# Patient Record
Sex: FEMALE | Race: WHITE | NOT HISPANIC OR LATINO | Employment: FULL TIME | URBAN - METROPOLITAN AREA
[De-identification: names, ages, dates, MRNs, and addresses within clinical notes are randomized per-mention and may not be internally consistent; named-entity substitution may affect disease eponyms.]

---

## 2023-04-12 DIAGNOSIS — E78.2 MIXED HYPERLIPIDEMIA: ICD-10-CM

## 2023-04-12 DIAGNOSIS — I10 ESSENTIAL (PRIMARY) HYPERTENSION: ICD-10-CM

## 2023-04-12 PROBLEM — L20.9 ATOPIC DERMATITIS: Status: ACTIVE | Noted: 2023-04-12

## 2023-04-12 PROBLEM — F32.A ANXIETY AND DEPRESSION: Status: ACTIVE | Noted: 2023-04-12

## 2023-04-12 PROBLEM — H60.90 OTITIS EXTERNA: Status: RESOLVED | Noted: 2023-04-12 | Resolved: 2023-04-12

## 2023-04-12 PROBLEM — J30.9 ALLERGIC RHINITIS: Status: ACTIVE | Noted: 2023-04-12

## 2023-04-12 PROBLEM — R10.2 FEMALE PELVIC PAIN: Status: ACTIVE | Noted: 2023-04-12

## 2023-04-12 PROBLEM — F41.9 ANXIETY AND DEPRESSION: Status: ACTIVE | Noted: 2023-04-12

## 2023-04-12 PROBLEM — M54.50 LOW BACK PAIN: Status: ACTIVE | Noted: 2023-04-12

## 2023-04-12 PROBLEM — N94.9 VAGINAL DISCOMFORT: Status: RESOLVED | Noted: 2023-04-12 | Resolved: 2023-04-12

## 2023-04-12 PROBLEM — E66.01 MORBID OBESITY WITH BODY MASS INDEX (BMI) OF 40.0 OR HIGHER (MULTI): Status: ACTIVE | Noted: 2023-04-12

## 2023-04-12 PROBLEM — M94.0 SLIPPING RIB SYNDROME: Status: ACTIVE | Noted: 2023-04-12

## 2023-04-12 PROBLEM — S22.39XA RIB FRACTURE: Status: RESOLVED | Noted: 2023-04-12 | Resolved: 2023-04-12

## 2023-04-12 RX ORDER — CETIRIZINE HYDROCHLORIDE 10 MG/1
1 TABLET ORAL DAILY
COMMUNITY
Start: 2021-04-20 | End: 2023-05-09

## 2023-04-12 RX ORDER — HYDROCODONE BITARTRATE AND ACETAMINOPHEN 5; 325 MG/1; MG/1
TABLET ORAL
COMMUNITY
Start: 2023-02-17 | End: 2023-05-09

## 2023-04-12 RX ORDER — LOSARTAN POTASSIUM 50 MG/1
50 TABLET ORAL DAILY
COMMUNITY
End: 2023-08-31 | Stop reason: SDUPTHER

## 2023-04-12 RX ORDER — HYDROXYZINE HYDROCHLORIDE 25 MG/1
TABLET, FILM COATED ORAL
COMMUNITY
Start: 2021-10-08 | End: 2023-04-17

## 2023-04-12 RX ORDER — ATORVASTATIN CALCIUM 10 MG/1
10 TABLET, FILM COATED ORAL DAILY
Qty: 90 TABLET | Refills: 0 | Status: SHIPPED | OUTPATIENT
Start: 2023-04-12 | End: 2023-05-09

## 2023-04-12 RX ORDER — ATORVASTATIN CALCIUM 10 MG/1
10 TABLET, FILM COATED ORAL DAILY
COMMUNITY
End: 2023-08-31 | Stop reason: SDUPTHER

## 2023-04-12 RX ORDER — DESVENLAFAXINE 100 MG/1
100 TABLET, EXTENDED RELEASE ORAL DAILY
COMMUNITY
End: 2023-05-09

## 2023-04-12 RX ORDER — FLUTICASONE PROPIONATE 50 MCG
SPRAY, SUSPENSION (ML) NASAL
COMMUNITY
Start: 2022-04-20 | End: 2023-05-09 | Stop reason: SDUPTHER

## 2023-04-12 RX ORDER — IPRATROPIUM BROMIDE 21 UG/1
SPRAY, METERED NASAL
COMMUNITY
Start: 2022-09-04

## 2023-04-12 RX ORDER — MONTELUKAST SODIUM 10 MG/1
1 TABLET ORAL DAILY
COMMUNITY
Start: 2023-01-16 | End: 2023-08-31

## 2023-04-12 RX ORDER — IBUPROFEN 800 MG/1
1 TABLET ORAL
COMMUNITY
Start: 2022-11-28 | End: 2023-05-09

## 2023-04-12 RX ORDER — LOSARTAN POTASSIUM 50 MG/1
50 TABLET ORAL DAILY
Qty: 90 TABLET | Refills: 0 | Status: SHIPPED | OUTPATIENT
Start: 2023-04-12 | End: 2023-05-09 | Stop reason: SDUPTHER

## 2023-04-17 DIAGNOSIS — F41.9 ANXIETY DISORDER, UNSPECIFIED: ICD-10-CM

## 2023-04-17 DIAGNOSIS — F32.A DEPRESSION, UNSPECIFIED: ICD-10-CM

## 2023-04-17 RX ORDER — HYDROXYZINE HYDROCHLORIDE 25 MG/1
TABLET, FILM COATED ORAL
Qty: 40 TABLET | Refills: 2 | Status: SHIPPED | OUTPATIENT
Start: 2023-04-17

## 2023-05-09 ENCOUNTER — OFFICE VISIT (OUTPATIENT)
Dept: PRIMARY CARE | Facility: CLINIC | Age: 48
End: 2023-05-09
Payer: COMMERCIAL

## 2023-05-09 VITALS
HEART RATE: 74 BPM | OXYGEN SATURATION: 98 % | BODY MASS INDEX: 36.37 KG/M2 | WEIGHT: 213 LBS | SYSTOLIC BLOOD PRESSURE: 136 MMHG | HEIGHT: 64 IN | DIASTOLIC BLOOD PRESSURE: 84 MMHG

## 2023-05-09 DIAGNOSIS — M85.80 OSTEOPENIA, UNSPECIFIED LOCATION: ICD-10-CM

## 2023-05-09 DIAGNOSIS — L29.9 PRURITIC DISORDER: ICD-10-CM

## 2023-05-09 DIAGNOSIS — F32.A ANXIETY AND DEPRESSION: ICD-10-CM

## 2023-05-09 DIAGNOSIS — I10 BENIGN HYPERTENSION: Primary | ICD-10-CM

## 2023-05-09 DIAGNOSIS — E78.2 COMBINED HYPERLIPIDEMIA: ICD-10-CM

## 2023-05-09 DIAGNOSIS — Z12.31 VISIT FOR SCREENING MAMMOGRAM: ICD-10-CM

## 2023-05-09 DIAGNOSIS — F41.9 ANXIETY AND DEPRESSION: ICD-10-CM

## 2023-05-09 DIAGNOSIS — E66.01 MORBID OBESITY WITH BODY MASS INDEX (BMI) OF 40.0 OR HIGHER (MULTI): ICD-10-CM

## 2023-05-09 DIAGNOSIS — M25.552 HIP PAIN, LEFT: ICD-10-CM

## 2023-05-09 DIAGNOSIS — J30.1 ALLERGIC RHINITIS DUE TO POLLEN, UNSPECIFIED SEASONALITY: ICD-10-CM

## 2023-05-09 PROBLEM — M79.644 PAIN OF FINGER OF RIGHT HAND: Status: ACTIVE | Noted: 2023-05-09

## 2023-05-09 PROBLEM — R29.898 HAND WEAKNESS: Status: ACTIVE | Noted: 2023-05-09

## 2023-05-09 PROBLEM — K59.00 CONSTIPATION: Status: ACTIVE | Noted: 2023-05-09

## 2023-05-09 PROBLEM — M25.641 STIFFNESS OF FINGER JOINT OF RIGHT HAND: Status: ACTIVE | Noted: 2023-05-09

## 2023-05-09 PROBLEM — S62.624A CLOSED DISPLACED FRACTURE OF MIDDLE PHALANX OF RIGHT RING FINGER: Status: ACTIVE | Noted: 2023-05-09

## 2023-05-09 PROBLEM — S69.91XA INJURY OF FINGER OF RIGHT HAND: Status: ACTIVE | Noted: 2023-05-09

## 2023-05-09 PROCEDURE — 3079F DIAST BP 80-89 MM HG: CPT | Performed by: FAMILY MEDICINE

## 2023-05-09 PROCEDURE — 3008F BODY MASS INDEX DOCD: CPT | Performed by: FAMILY MEDICINE

## 2023-05-09 PROCEDURE — 1036F TOBACCO NON-USER: CPT | Performed by: FAMILY MEDICINE

## 2023-05-09 PROCEDURE — 3075F SYST BP GE 130 - 139MM HG: CPT | Performed by: FAMILY MEDICINE

## 2023-05-09 PROCEDURE — 99214 OFFICE O/P EST MOD 30 MIN: CPT | Performed by: FAMILY MEDICINE

## 2023-05-09 RX ORDER — FLUTICASONE PROPIONATE 50 MCG
2 SPRAY, SUSPENSION (ML) NASAL DAILY
Qty: 16 G | Refills: 3 | Status: SHIPPED | OUTPATIENT
Start: 2023-05-09 | End: 2023-06-01

## 2023-05-09 RX ORDER — DESVENLAFAXINE 100 MG/1
1 TABLET, EXTENDED RELEASE ORAL DAILY
COMMUNITY
Start: 2017-11-27 | End: 2023-10-16

## 2023-05-09 ASSESSMENT — ENCOUNTER SYMPTOMS: SHORTNESS OF BREATH: 0

## 2023-05-09 NOTE — PROGRESS NOTES
Subjective   Patient ID: Farideh Gomez is a 48 y.o. female who presents for medication review. States she has been taking the Hydroxyzine at bedtime for her itchy hands. Would like to know if she can continue to do so? States she ran out of the Zyrtec OTC 3 months ago and never went back on it. No additional concerns.     Hypertension  : Patient is taking blood pressure medications as directed.  Blood pressures have been averaging:  Pt denies Chest Pain or Shortness of Breath     Anxiety: Currently stable with anxiety, minimal symptoms.  Stable on current medications, helping with    symptoms     Itching on hands at night   No specific environment  Uses lotion   Hydroxyzine works well        Exercises     Hip a little pain at times   Remote fracture   Taking vitamin          Review of Systems   Respiratory:  Negative for shortness of breath.    Cardiovascular:  Negative for chest pain.       Objective   There were no vitals taken for this visit.    Physical Exam  Constitutional:       General: She is not in acute distress.     Appearance: Normal appearance.   HENT:      Head: Normocephalic and atraumatic.      Right Ear: Tympanic membrane, ear canal and external ear normal.      Left Ear: Tympanic membrane, ear canal and external ear normal.      Nose: Nose normal.      Mouth/Throat:      Mouth: Mucous membranes are moist.      Pharynx: Oropharynx is clear.   Eyes:      Extraocular Movements: Extraocular movements intact.      Conjunctiva/sclera: Conjunctivae normal.      Pupils: Pupils are equal, round, and reactive to light.   Neck:      Vascular: No carotid bruit.   Cardiovascular:      Rate and Rhythm: Normal rate and regular rhythm.      Pulses: Normal pulses.      Heart sounds: Normal heart sounds. No murmur heard.  Pulmonary:      Effort: Pulmonary effort is normal.      Breath sounds: Normal breath sounds. No wheezing, rhonchi or rales.   Abdominal:      General: Abdomen is flat. Bowel sounds are normal.  There is no distension.      Palpations: Abdomen is soft.      Tenderness: There is no abdominal tenderness. There is no guarding or rebound.   Musculoskeletal:         General: Normal range of motion.      Cervical back: No tenderness.   Lymphadenopathy:      Cervical: No cervical adenopathy.   Skin:     General: Skin is warm and dry.      Findings: No rash.   Neurological:      General: No focal deficit present.      Mental Status: She is alert and oriented to person, place, and time.      Cranial Nerves: No cranial nerve deficit.      Coordination: Coordination normal.      Gait: Gait normal.   Psychiatric:         Mood and Affect: Mood normal.         Behavior: Behavior normal.         Assessment/Plan   Problem List Items Addressed This Visit       Anxiety and depression    Relevant Orders    CBC    Comprehensive Metabolic Panel    Food Allergy Profile IgE    Respiratory Allergy Profile IgE    Benign hypertension - Primary    Relevant Orders    CBC    Comprehensive Metabolic Panel    Food Allergy Profile IgE    Respiratory Allergy Profile IgE    Combined hyperlipidemia    Relevant Orders    CBC    Comprehensive Metabolic Panel    Food Allergy Profile IgE    Respiratory Allergy Profile IgE    Morbid obesity with body mass index (BMI) of 40.0 or higher (CMS/[facility])    Relevant Orders    CBC    Comprehensive Metabolic Panel    Food Allergy Profile IgE    Respiratory Allergy Profile IgE     Other Visit Diagnoses       Urticaria        Relevant Orders    CBC    Comprehensive Metabolic Panel    Food Allergy Profile IgE    Respiratory Allergy Profile IgE

## 2023-05-09 NOTE — PATIENT INSTRUCTIONS
Get your blood work as ordered.  You should hear from our office with results whether they are normal are not within a few days.  Please call the office if you do not hear from us.     You should be getting cardiovascular exercise 3-5 times per week for 30-45 minutes.  This includes exercises such as running, brisk walking, biking or swimming.     Hypertension Plan:  You should check your blood pressures 2-3 times per month.  Your goal should be systolic (upper number ) < 140, and diastolic (bottom number) < 90.  Please periodically inform office of your BP numbers.  You should follow a low salt diet and exercise routinely.  It is important that you keep your weight under control.  With hypertension you should be seen in the office at least twice per year.

## 2023-05-13 NOTE — RESULT ENCOUNTER NOTE
Please notify pt of lab results, x-ray of the bones normal, pain likely related to muscles and ligaments

## 2023-05-15 ENCOUNTER — TELEPHONE (OUTPATIENT)
Dept: PRIMARY CARE | Facility: CLINIC | Age: 48
End: 2023-05-15
Payer: COMMERCIAL

## 2023-05-15 NOTE — TELEPHONE ENCOUNTER
----- Message from Cata Luis MD sent at 5/13/2023 10:34 AM EDT -----  Please notify pt of lab results, x-ray of the bones normal, pain likely related to muscles and ligaments

## 2023-05-15 NOTE — TELEPHONE ENCOUNTER
Result Communication    Resulted Orders   XR hip w pelvis    Narrative    Interpreted By:  LYNDSEY LINDQUIST MD  MRN: [id number]  Patient Name: ENEIDA COLON     STUDY:  HIP, UNILATERAL W/PELVIS WHEN PERFORMED 2-3 VIEWS;  5/10/2023 11:52 am     INDICATION:  pain.     COMPARISON:  None.     ACCESSION NUMBER(S):  [id number]     ORDERING CLINICIAN:  PORTILLO FITZGERALD     FINDINGS:  Pelvis and left hip, three views     There is no fracture. There is no dislocation. No degenerative  changes seen.       Impression    Normal radiographs of the left hip       1:56 PM      Results were successfully communicated with the patient and they acknowledged their understanding.

## 2023-06-01 DIAGNOSIS — J30.1 ALLERGIC RHINITIS DUE TO POLLEN, UNSPECIFIED SEASONALITY: ICD-10-CM

## 2023-06-01 RX ORDER — FLUTICASONE PROPIONATE 50 MCG
2 SPRAY, SUSPENSION (ML) NASAL DAILY
Qty: 16 ML | Refills: 3 | Status: SHIPPED | OUTPATIENT
Start: 2023-06-01 | End: 2023-06-27

## 2023-06-12 ENCOUNTER — LAB (OUTPATIENT)
Dept: LAB | Facility: LAB | Age: 48
End: 2023-06-12
Payer: COMMERCIAL

## 2023-06-12 DIAGNOSIS — F41.9 ANXIETY AND DEPRESSION: ICD-10-CM

## 2023-06-12 DIAGNOSIS — I10 BENIGN HYPERTENSION: ICD-10-CM

## 2023-06-12 DIAGNOSIS — M85.80 OSTEOPENIA, UNSPECIFIED LOCATION: ICD-10-CM

## 2023-06-12 DIAGNOSIS — E66.01 MORBID OBESITY WITH BODY MASS INDEX (BMI) OF 40.0 OR HIGHER (MULTI): ICD-10-CM

## 2023-06-12 DIAGNOSIS — E78.2 COMBINED HYPERLIPIDEMIA: ICD-10-CM

## 2023-06-12 DIAGNOSIS — F32.A ANXIETY AND DEPRESSION: ICD-10-CM

## 2023-06-12 LAB
ALANINE AMINOTRANSFERASE (SGPT) (U/L) IN SER/PLAS: 22 U/L (ref 7–45)
ALBUMIN (G/DL) IN SER/PLAS: 4.4 G/DL (ref 3.4–5)
ALKALINE PHOSPHATASE (U/L) IN SER/PLAS: 46 U/L (ref 33–110)
ANION GAP IN SER/PLAS: 12 MMOL/L (ref 10–20)
ASPARTATE AMINOTRANSFERASE (SGOT) (U/L) IN SER/PLAS: 15 U/L (ref 9–39)
BILIRUBIN TOTAL (MG/DL) IN SER/PLAS: 0.4 MG/DL (ref 0–1.2)
CALCIUM (MG/DL) IN SER/PLAS: 9.2 MG/DL (ref 8.6–10.3)
CARBON DIOXIDE, TOTAL (MMOL/L) IN SER/PLAS: 29 MMOL/L (ref 21–32)
CHLORIDE (MMOL/L) IN SER/PLAS: 101 MMOL/L (ref 98–107)
CREATININE (MG/DL) IN SER/PLAS: 0.66 MG/DL (ref 0.5–1.05)
ERYTHROCYTE DISTRIBUTION WIDTH (RATIO) BY AUTOMATED COUNT: 12.7 % (ref 11.5–14.5)
ERYTHROCYTE MEAN CORPUSCULAR HEMOGLOBIN CONCENTRATION (G/DL) BY AUTOMATED: 32.5 G/DL (ref 32–36)
ERYTHROCYTE MEAN CORPUSCULAR VOLUME (FL) BY AUTOMATED COUNT: 88 FL (ref 80–100)
ERYTHROCYTES (10*6/UL) IN BLOOD BY AUTOMATED COUNT: 4.7 X10E12/L (ref 4–5.2)
GFR FEMALE: >90 ML/MIN/1.73M2
GLUCOSE (MG/DL) IN SER/PLAS: 74 MG/DL (ref 74–99)
HEMATOCRIT (%) IN BLOOD BY AUTOMATED COUNT: 41.2 % (ref 36–46)
HEMOGLOBIN (G/DL) IN BLOOD: 13.4 G/DL (ref 12–16)
LEUKOCYTES (10*3/UL) IN BLOOD BY AUTOMATED COUNT: 4.8 X10E9/L (ref 4.4–11.3)
PLATELETS (10*3/UL) IN BLOOD AUTOMATED COUNT: 276 X10E9/L (ref 150–450)
POTASSIUM (MMOL/L) IN SER/PLAS: 5 MMOL/L (ref 3.5–5.3)
PROTEIN TOTAL: 7 G/DL (ref 6.4–8.2)
SODIUM (MMOL/L) IN SER/PLAS: 137 MMOL/L (ref 136–145)
UREA NITROGEN (MG/DL) IN SER/PLAS: 14 MG/DL (ref 6–23)

## 2023-06-12 PROCEDURE — 82306 VITAMIN D 25 HYDROXY: CPT

## 2023-06-12 PROCEDURE — 80053 COMPREHEN METABOLIC PANEL: CPT

## 2023-06-12 PROCEDURE — 85027 COMPLETE CBC AUTOMATED: CPT

## 2023-06-12 PROCEDURE — 36415 COLL VENOUS BLD VENIPUNCTURE: CPT

## 2023-06-12 PROCEDURE — 86003 ALLG SPEC IGE CRUDE XTRC EA: CPT

## 2023-06-12 PROCEDURE — 82785 ASSAY OF IGE: CPT

## 2023-06-13 LAB
ALLERGEN ANIMAL: CAT DANDER IGE (KU/L): <0.1 KU/L
ALLERGEN ANIMAL: DOG DANDER IGE (KU/L): <0.1 KU/L
ALLERGEN FOOD: CLAM (RUDITAPES SPP.) IGE (KU/L): <0.1 KU/L
ALLERGEN FOOD: EGG WHITE IGE (KU/L): 0.14 KU/L
ALLERGEN FOOD: FISH (COD) GADUS MORHUA) IGE (KU/L): <0.1 KU/L
ALLERGEN FOOD: MAIZE, CORN (ZEA MAYS) IGE (KU/L): <0.1 KU/L
ALLERGEN FOOD: MILK IGE (KU/L): 0.34 KU/L
ALLERGEN FOOD: PEANUT (ARACHIS HYPOGAEA) IGE (KU/L): 0.13 KU/L
ALLERGEN FOOD: SCALLOP (PECTEN SPP.) IGE (KU/L): <0.1 KU/L
ALLERGEN FOOD: SESAME SEED (SESAMUM INDICUM) IGE (KU/L): <0.1 KU/L
ALLERGEN FOOD: SHRIMP (P. BOREALIS/MONODON, M. BARBATA/JOYNERI) IGE (KU/L): <0.1 KU/L
ALLERGEN FOOD: SOYBEAN (GLYCINE MAX) IGE (KU/L): <0.1 KU/L
ALLERGEN FOOD: WALNUT (JUGLANS SPP.) IGE (KU/L): <0.1 KU/L
ALLERGEN FOOD: WHEAT (TRITICUM AESTIVUM) IGE (KU/L): <0.1 KU/L
ALLERGEN GRASS: BERMUDA GRASS (CYNODON DACTYLON) IGE (KU/L): <0.1 KU/L
ALLERGEN GRASS: JOHNSON GRASS (SORGHUM HALEPENSE) IGE (KU/L): <0.1 KU/L
ALLERGEN GRASS: MEADOW GRASS, KENTUCKY BLUE (POA PRATENSIS )IGE (KU/L): <0.1 KU/L
ALLERGEN GRASS: TIMOTHY GRASS (PHLEUM PRATENSE) IGE (KU/L): <0.1 KU/L
ALLERGEN INSECT: COCKROACH IGE: <0.1 KU/L
ALLERGEN MICROORGANISM: ALTERNARIA ALTERNATA IGE (KU/L): <0.1 KU/L
ALLERGEN MICROORGANISM: ASPERGILLUS FUMIGATUS IGE (KU/L): <0.1 KU/L
ALLERGEN MICROORGANISM: CLADOSPORIUM HERBARUM IGE (KU/L): <0.1 KU/L
ALLERGEN MICROORGANISM: PENICILLIUM CHRYSOGENUM (P. NOTATUM) IGE (KU/L): <0.1 KU/L
ALLERGEN MITE: DERMATOPHAGOIDES FARINAE (HOUSE DUST MITE) IGE (KU/L): <0.1 KU/L
ALLERGEN MITE: DERMATOPHAGOIDES PTERONYSSINUS (HOUSE DUST MITE) IGE (KU/L): <0.1 KU/L
ALLERGEN TREE: BOX-ELDER (ACER NEGUNDO) IGE (KU/L): <0.1 KU/L
ALLERGEN TREE: COMMON SILVER BIRCH (BETULA VERRUCOSA) IGE (KU/L): <0.1 KU/L
ALLERGEN TREE: COTTONWOOD (POPULUS DELTOIDES) IGE (KU/L): <0.1 KU/L
ALLERGEN TREE: ELM (ULMUS AMERICANA) IGE (KU/L): <0.1 KU/L
ALLERGEN TREE: MAPLE LEAF SYCAMORE, LONDON PLANE IGE (KU/L): <0.1 KU/L
ALLERGEN TREE: MOUNTAIN JUNIPER (JUNIPERUS SABINOIDES) IGE (KU/L): <0.1 KU/L
ALLERGEN TREE: MULBERRY (MORUS ALBA) IGE (KU/L): <0.1 KU/L
ALLERGEN TREE: OAK (QUERCUS ALBA) IGE (KU/L): <0.1 KU/L
ALLERGEN TREE: PECAN, HICKORY (CARYA PECAN) IGE (KU/L): <0.1 KU/L
ALLERGEN TREE: WALNUT IGE: <0.1 KU/L
ALLERGEN TREE: WHITE ASH (FRAXINUS AMERICANA) IGE (KU/L): <0.1 KU/L
ALLERGEN WEED: COMMON PIGWEED (AMARANTHUS RETROFLEXUS) IGE (KU/L): <0.1 KU/L
ALLERGEN WEED: COMMON RAGWEED (AMB. ARTEMISIIFOLIA/A. ELATIOR) IGE (KU/L): <0.1 KU/L
ALLERGEN WEED: GOOSEFOOT, LAMB'S QUARTERS (CHENOPODIUM ALBUM) IGE (KU/L): <0.1 KU/L
ALLERGEN WEED: PLANTAIN (ENGLISH), RIBWORT (PLANTAGO LANCEOLATA) IGE (KU/L): <0.1 KU/L
ALLERGEN WEED: PRICKLY SALTWORT/RUSSIAN THISTLE (SALSOLA KALI) IGE (KU/L): <0.1 KU/L
ALLERGEN WEED: SHEEP SORREL (RUMEX ACETOSELLA) IGE (KU/L): <0.1 KU/L
CALCIDIOL (25 OH VITAMIN D3) (NG/ML) IN SER/PLAS: 34 NG/ML
IMMUNOCAP IGE: 32.2 KU/L (ref 0–214)
IMMUNOCAP INTERPRETATION: NORMAL
IMMUNOCAP INTERPRETATION: NORMAL

## 2023-06-14 ENCOUNTER — TELEPHONE (OUTPATIENT)
Dept: PRIMARY CARE | Facility: CLINIC | Age: 48
End: 2023-06-14
Payer: COMMERCIAL

## 2023-06-14 NOTE — TELEPHONE ENCOUNTER
Result Communication    Resulted Orders   CBC   Result Value Ref Range    WBC 4.8 4.4 - 11.3 x10E9/L    RBC 4.70 4.00 - 5.20 x10E12/L    Hemoglobin 13.4 12.0 - 16.0 g/dL    Hematocrit 41.2 36.0 - 46.0 %    MCV 88 80 - 100 fL    MCHC 32.5 32.0 - 36.0 g/dL    Platelets 276 150 - 450 x10E9/L    RDW 12.7 11.5 - 14.5 %   Comprehensive Metabolic Panel   Result Value Ref Range    Glucose 74 74 - 99 mg/dL    Sodium 137 136 - 145 mmol/L    Potassium 5.0 3.5 - 5.3 mmol/L    Chloride 101 98 - 107 mmol/L    Bicarbonate 29 21 - 32 mmol/L    Anion Gap 12 10 - 20 mmol/L    Urea Nitrogen 14 6 - 23 mg/dL    Creatinine 0.66 0.50 - 1.05 mg/dL    GFR Female >90 >90 mL/min/1.73m2      Comment:       CALCULATIONS OF ESTIMATED GFR ARE PERFORMED   USING THE 2021 CKD-EPI STUDY REFIT EQUATION   WITHOUT THE RACE VARIABLE FOR THE IDMS-TRACEABLE   CREATININE METHODS.    https://jasn.asnjournals.org/content/early/2021/09/22/ASN.7563808468    Calcium 9.2 8.6 - 10.3 mg/dL    Albumin 4.4 3.4 - 5.0 g/dL    Alkaline Phosphatase 46 33 - 110 U/L    Total Protein 7.0 6.4 - 8.2 g/dL    AST 15 9 - 39 U/L    Total Bilirubin 0.4 0.0 - 1.2 mg/dL    ALT (SGPT) 22 7 - 45 U/L      Comment:       Patients treated with Sulfasalazine may generate    falsely decreased results for ALT.   Food Allergy Profile IgE   Result Value Ref Range    Clam IgE <0.10 <0.35 KU/L      Comment:        SEE IMMUNOCAP INTERP.IGE     Fish (Cod) IgE <0.10 <0.35 KU/L      Comment:        SEE IMMUNOCAP INTERP.IGE     [place], Corn IgE <0.10 <0.35 KU/L      Comment:        SEE IMMUNOCAP INTERP.IGE     Egg White IgE 0.14 <0.35 KU/L      Comment:        SEE IMMUNOCAP INTERP.IGE     Milk IgE 0.34 <0.35 KU/L      Comment:        SEE IMMUNOCAP INTERP.IGE     Peanut IgE 0.13 <0.35 KU/L      Comment:        SEE IMMUNOCAP INTERP.IGE     Scallop IgE <0.10 <0.35 KU/L      Comment:        SEE IMMUNOCAP INTERP.IGE     Sesame Seed IgE <0.10 <0.35 KU/L      Comment:        SEE IMMUNOCAP INTERP.IGE      Shrimp IgE <0.10 <0.35 KU/L      Comment:        SEE IMMUNOCAP INTERP.IGE     Soybean IgE <0.10 <0.35 KU/L      Comment:        SEE IMMUNOCAP INTERP.IGE     [place] IgE <0.10 <0.35 KU/L      Comment:        SEE IMMUNOCAP INTERP.IGE     Wheat IgE <0.10 <0.35 KU/L      Comment:        SEE IMMUNOCAP INTERP.IGE     Immunocap Interpretation SEE COMMENT       Comment:           REFERENCE RANGE (IMMUNOCAP) IGE   KU/L           CLASS     INTERPRETATION       <  0.10       0       BELOW DETECTION   0.10-  0.34      0/1      EQUIVOCAL   0.35-  0.69       1       LOW POSITIVE   0.70-  3.49       2       MODERATE POSITIVE   3.50- 17.49       3       HIGH POSITIVE  17.50- 49          4       VERY HIGH POSITIVE  50   - 99          5       VERY HIGH POSITIVE       >100          6       VERY HIGH POSITIVE   Respiratory Allergy Profile IgE   Result Value Ref Range    Immunocap IgE 32.2 0.0 - 214.0 KU/L      Comment:       Note:  Omalizumab (Xolair, Booktrope; humanized    IgG1 antihuman IgE Fc) treatment does not    significantly interfere with the accuracy of    total IgE on the ImmunoCAP (TRUSTe) platform.    J Allergy Clin Immunol 2006;117:759-66).   Allergens, parasitic diseases, smoking, and   alcohol consumption have been reported to   increase levels of total IgE in serum.    B[place] Grass IgE <0.10 <0.35 KU/L      Comment:        SEE IMMUNOCAP INTERP.IGE     Ge[place]ias Grass IgE <0.10 <0.35 KU/L      Comment:        SEE IMMUNOCAP INTERP.IGE     [place] Grass, [place] Blue IgE <0.10 <0.35 KU/L      Comment:        SEE IMMUNOCAP INTERP.IGE     [place]io Grass IgE <0.10 <0.35 KU/L      Comment:        SEE IMMUNOCAP INTERP.IGE     [place], Lamb's Quarters IgE <0.10 <0.35 KU/L      Comment:        SEE IMMUNOCAP INTERP.IGE     Common Pigweed IgE <0.10 <0.35 KU/L      Comment:        SEE IMMUNOCAP INTERP.IGE     Common Ragweed IgE <0.10 <0.35 KU/L      Comment:        SEE IMMUNOCAP INTERP.IGE     White [place] IgE <0.10 <0.35 KU/L       Comment:        SEE IMMUNOCAP INTERP.IGE     Common Silver Birch IgE <0.10 <0.35 KU/L      Comment:        SEE IMMUNOCAP INTERP.IGE     Box-Elder IgE <0.10 <0.35 KU/L      Comment:        SEE IMMUNOCAP INTERP.IGE     Mountain Juniper IgE <0.10 <0.35 KU/L      Comment:        SEE IMMUNOCAP INTERP.IGE     [place] IgE <0.10 <0.35 KU/L      Comment:        SEE IMMUNOCAP INTERP.IGE     Elm IgE <0.10 <0.35 KU/L      Comment:        SEE IMMUNOCAP INTERP.IGE     [place] IgE <0.10 <0.35 KU/L      Comment:        SEE IMMUNOCAP INTERP.IGE     Oak IgE <0.10 <0.35 KU/L      Comment:        SEE IMMUNOCAP INTERP.IGE     Pecan, Hickory IgE <0.10 <0.35 KU/L      Comment:        SEE IMMUNOCAP INTERP.IGE     [place], [place] IgE <0.10 <0.35 KU/L      Comment:        SEE IMMUNOCAP INTERP.IGE     [place] Tree IgE <0.10 <0.35 KU/L      Comment:        SEE IMMUNOCAP INTERP.IGE     Prickly Saltwort/Russian Thistle IgE <0.10 <0.35 KU/L      Comment:        SEE IMMUNOCAP INTERP.IGE     Sheep Sorrel IgE <0.10 <0.35 KU/L      Comment:        SEE IMMUNOCAP INTERP.IGE     Cat Dander IgE <0.10 <0.35 KU/L      Comment:        SEE IMMUNOCAP INTERP.IGE     Dog Dander IgE <0.10 <0.35 KU/L      Comment:        SEE IMMUNOCAP INTERP.IGE     Alternaria Alternata IgE <0.10 <0.35 KU/L      Comment:        SEE IMMUNOCAP INTERP.IGE     Aspergillus Fumigatus IgE <0.10 <0.35 KU/L      Comment:        SEE IMMUNOCAP INTERP.IGE     Cladosporium Herbarum IgE <0.10 <0.35 KU/L      Comment:        SEE IMMUNOCAP INTERP.IGE     Penicillium Chrysogenum (P. notatum) IgE <0.10 <0.35 KU/L      Comment:        SEE IMMUNOCAP INTERP.IGE     Plantain IgE <0.10 <0.35 KU/L      Comment:        SEE IMMUNOCAP INTERP.IGE     Dust Mite (D. farinae) IgE <0.10 <0.35 KU/L      Comment:        SEE IMMUNOCAP INTERP.IGE     Dust Mite (D. pteronyssinus) IgE <0.10 <0.35 KU/L      Comment:        SEE IMMUNOCAP INTERP.IGE     Egyptian Cockroach IgE <0.10 <0.35 KU/L       Comment:        SEE IMMUNOCAP INTERP.IGE     Immunocap Interpretation SEE COMMENT       Comment:           REFERENCE RANGE (IMMUNOCAP) IGE   KU/L           CLASS     INTERPRETATION       <  0.10       0       BELOW DETECTION   0.10-  0.34      0/1      EQUIVOCAL   0.35-  0.69       1       LOW POSITIVE   0.70-  3.49       2       MODERATE POSITIVE   3.50- 17.49       3       HIGH POSITIVE  17.50- 49          4       VERY HIGH POSITIVE  50   - 99          5       VERY HIGH POSITIVE       >100          6       VERY HIGH POSITIVE   Vitamin D 25-Hydroxy,Total   Result Value Ref Range    Vitamin D, 25-Hydroxy 34 ng/mL      Comment:      .  DEFICIENCY:         < 20   NG/ML  INSUFFICIENCY:      20-29  NG/ML  SUFFICIENCY:         NG/ML    THIS ASSAY ACCURATELY QUANTIFIES THE SUM OF  VITAMIN D3, 25-HYDROXY AND VIT D2,25-HYDROXY.       3:06 PM      Results were successfully communicated with the patient and they acknowledged their understanding.

## 2023-06-14 NOTE — TELEPHONE ENCOUNTER
----- Message from Cata Luis MD sent at 6/14/2023 10:54 AM EDT -----  Lab results are normal allergy panels are negative, this does not exclude allergies but test for some of the most common ones  Vitamin D, electrolytes, kidney and liver, blood count all normal

## 2023-06-27 DIAGNOSIS — J30.1 ALLERGIC RHINITIS DUE TO POLLEN, UNSPECIFIED SEASONALITY: ICD-10-CM

## 2023-06-27 RX ORDER — FLUTICASONE PROPIONATE 50 MCG
2 SPRAY, SUSPENSION (ML) NASAL DAILY
Qty: 16 ML | Refills: 3 | Status: SHIPPED | OUTPATIENT
Start: 2023-06-27 | End: 2023-09-25

## 2023-07-10 NOTE — RESULT ENCOUNTER NOTE
Your mammogram results were normal.  Follow-up with yearly mammogram or as directed by your doctor.

## 2023-09-23 DIAGNOSIS — J30.1 ALLERGIC RHINITIS DUE TO POLLEN, UNSPECIFIED SEASONALITY: ICD-10-CM

## 2023-09-25 RX ORDER — FLUTICASONE PROPIONATE 50 MCG
2 SPRAY, SUSPENSION (ML) NASAL DAILY
Qty: 48 ML | Refills: 2 | Status: SHIPPED | OUTPATIENT
Start: 2023-09-25

## 2023-10-13 DIAGNOSIS — F41.9 ANXIETY DISORDER, UNSPECIFIED: ICD-10-CM

## 2023-10-13 DIAGNOSIS — F32.A DEPRESSION, UNSPECIFIED: ICD-10-CM

## 2023-10-13 DIAGNOSIS — E78.2 COMBINED HYPERLIPIDEMIA: ICD-10-CM

## 2023-10-16 RX ORDER — DESVENLAFAXINE 100 MG/1
100 TABLET, EXTENDED RELEASE ORAL DAILY
Qty: 90 TABLET | Refills: 1 | Status: SHIPPED | OUTPATIENT
Start: 2023-10-16 | End: 2024-04-22

## 2023-10-16 RX ORDER — ATORVASTATIN CALCIUM 10 MG/1
10 TABLET, FILM COATED ORAL DAILY
Qty: 90 TABLET | Refills: 1 | Status: SHIPPED | OUTPATIENT
Start: 2023-10-16 | End: 2024-03-08 | Stop reason: SDUPTHER

## 2023-10-18 ENCOUNTER — HOSPITAL ENCOUNTER (OUTPATIENT)
Dept: RADIOLOGY | Facility: HOSPITAL | Age: 48
Discharge: HOME | End: 2023-10-18
Payer: COMMERCIAL

## 2023-10-18 ENCOUNTER — OFFICE VISIT (OUTPATIENT)
Dept: ORTHOPEDIC SURGERY | Facility: CLINIC | Age: 48
End: 2023-10-18
Payer: COMMERCIAL

## 2023-10-18 DIAGNOSIS — M25.511 RIGHT SHOULDER PAIN, UNSPECIFIED CHRONICITY: ICD-10-CM

## 2023-10-18 DIAGNOSIS — M25.811 IMPINGEMENT OF RIGHT SHOULDER: Primary | ICD-10-CM

## 2023-10-18 DIAGNOSIS — M75.41 IMPINGEMENT SYNDROME OF RIGHT SHOULDER: ICD-10-CM

## 2023-10-18 PROCEDURE — 20610 DRAIN/INJ JOINT/BURSA W/O US: CPT | Performed by: PHYSICIAN ASSISTANT

## 2023-10-18 PROCEDURE — 1036F TOBACCO NON-USER: CPT | Performed by: PHYSICIAN ASSISTANT

## 2023-10-18 PROCEDURE — 3008F BODY MASS INDEX DOCD: CPT | Performed by: PHYSICIAN ASSISTANT

## 2023-10-18 PROCEDURE — 73030 X-RAY EXAM OF SHOULDER: CPT | Mod: RT,FY

## 2023-10-18 PROCEDURE — 99214 OFFICE O/P EST MOD 30 MIN: CPT | Performed by: PHYSICIAN ASSISTANT

## 2023-10-18 PROCEDURE — 73030 X-RAY EXAM OF SHOULDER: CPT | Mod: RIGHT SIDE | Performed by: RADIOLOGY

## 2023-10-18 RX ORDER — NAPROXEN 500 MG/1
500 TABLET ORAL 2 TIMES DAILY PRN
Qty: 60 TABLET | Refills: 0 | Status: SHIPPED | OUTPATIENT
Start: 2023-10-18 | End: 2023-11-17

## 2023-10-18 RX ORDER — TRIAMCINOLONE ACETONIDE 40 MG/ML
10 INJECTION, SUSPENSION INTRA-ARTICULAR; INTRAMUSCULAR
Status: COMPLETED | OUTPATIENT
Start: 2023-10-18 | End: 2023-10-18

## 2023-10-18 RX ADMIN — TRIAMCINOLONE ACETONIDE 10 MG: 40 INJECTION, SUSPENSION INTRA-ARTICULAR; INTRAMUSCULAR at 15:09

## 2023-10-18 NOTE — PROGRESS NOTES
History of Present Illness:    48-year-old female presenting with right shoulder pain for about the last month.  Patient recalls no specific trauma or injury.  Describes the shoulder is being generally sore and achy.  She notes increased pain with overhead reaching lifting and carrying along with sleeping at night.  She has been treating the shoulder with rest and anti-inflammatories with mild improvement.    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    Foreign body in vulva and vagina, initial encounter 02/22/2017    Retained tampon    Morbid (severe) obesity due to excess calories (CMS/[facility]) 10/08/2021    Morbid obesity with body mass index (BMI) of 40.0 or higher    Other abnormal glucose     Hemoglobin A1c less than 7.0%    Otitis externa 04/12/2023    Pelvic and perineal pain 01/15/2018    Female pelvic pain    Personal history of (healed) traumatic fracture 04/11/2022    History of fracture of rib    Personal history of other diseases of the musculoskeletal system and connective tissue 01/15/2018    History of low back pain    Personal history of other diseases of the respiratory system     History of pharyngitis    Rib fracture 04/12/2023    Unspecified condition associated with female genital organs and menstrual cycle 02/22/2017    Vaginal discomfort       Medication Documentation Review Audit       Reviewed by Allison Sanchez MA (Medical Assistant) on 10/18/23 at 1452      Medication Order Taking? Sig Documenting Provider Last Dose Status   atorvastatin (Lipitor) 10 mg tablet [id number]  TAKE 1 TABLET BY MOUTH EVERY DAY Cata Luis MD  Active   desvenlafaxine 100 mg 24 hr tablet [id number]  Take 1 tablet (100 mg) by mouth once daily. Cata Luis MD  Active   fluticasone (Flonase) 50  mcg/actuation nasal spray [id number]  ADMINISTER 2 SPRAYS INTO EACH NOSTRIL ONCE DAILY. SHAKE GENTLY. BEFORE FIRST USE, PRIME PUMP. AFTER USE, CLEAN TIP AND REPLACE CAP. Cata Luis MD  Active   hydrOXYzine HCL (Atarax) 25 mg tablet [id number] No TAKE 1 TABLET 3 TIMES DAILY AS NEEDED. FOR ANXIETY Cata Luis MD Taking Active   ipratropium (Atrovent) 21 mcg (0.03 %) nasal spray [id number] No TAKE 2 SPRAY(S) INTRANASALLY 3 TIMES A DAY, AS NEEDED FOR NASAL CONGESTION Historical Provider, MD Taking Active   losartan (Cozaar) 50 mg tablet [id number]  Take 1 tablet (50 mg) by mouth once daily. Cata Luis MD  Active   montelukast (Singulair) 10 mg tablet [id number]  TAKE 1 TABLET BY MOUTH EVERY DAY Cata Luis MD  Active                    No Known Allergies    Social History     Socioeconomic History    Marital status:      Spouse name: Not on file    Number of children: Not on file    Years of education: Not on file    Highest education level: Not on file   Occupational History    Not on file   Tobacco Use    Smoking status: Never    Smokeless tobacco: Never   Substance and Sexual Activity    Alcohol use: Yes     Comment: social    Drug use: Never    Sexual activity: Not on file   Other Topics Concern    Not on file   Social History Narrative    Not on file     Social Determinants of Health     Financial Resource Strain: Not on file   Food Insecurity: Not on file   Transportation Needs: Not on file   Physical Activity: Not on file   Stress: Not on file   Social Connections: Not on file   Intimate Partner Violence: Not on file   Housing Stability: Not on file       Past Surgical History:   Procedure Laterality Date    ANKLE SURGERY  02/22/2017    Ankle Surgery    APPENDECTOMY  02/22/2017    Appendectomy         Review of Systems:    GENERAL: Negative  GI: Negative  MUSCULOSKELETAL: See HPI  SKIN: Negative  NEURO:  Negative     Physical Exam:    Shoulder:  Right shoulder range of motion forward  flexion abduction to 140 degrees.  Externally rotates 60 degrees.  Internally rotates to L3.  Biceps Tenderness negative  AC Joint Tenderness positive  Neers positive  Kwok positive  Jobes test positive for pain however she has good strength  Elbow and wrist motion were not irritable.  Radial pulse 2+ and palpable. SILT. UE is NVI.     Imaging     Xrays were ordered and obtained by myself, Erin Williamson PAC. I personally reviewed the images today and the following is my personal findings:   Normal x-rays of the right shoulder    Patient ID: Farideh Gomez is a 48 y.o. female.    L Inj/Asp: R subacromial bursa on 10/18/2023 3:09 PM  Indications: pain  Details: 22 G needle, posterior approach  Medications: 10 mg triamcinolone acetonide 40 mg/mL  Outcome: tolerated well, no immediate complications  Procedure, treatment alternatives, risks and benefits explained, specific risks discussed. Consent was given by the patient. Immediately prior to procedure a time out was called to verify the correct patient, procedure, equipment, support staff and site/side marked as required. Patient was prepped and draped in the usual sterile fashion.           Assessment   We had a long discussion in regards to the patient's shoulder pain.  The differential diagnosis of the patient's shoulder pain include: shoulder impingement, rotator cuff tendinopathy, rotator cuff tearing, and biceps tendinopathy as all being potential sources of the pain.  There are numerous non-operative and operative treatment options for each of these conditions.     Plan  We will start off by treating the patient's shoulder conservatively (AKA non-operatively).  We gave the patient a prescription for physical therapy to work on increasing the range of motion and strength in the shoulder.   We also called in a prescription for anti-inflammatories for the patient.  The patient was informed that there are rare risks of using nonsteroidal antiinflammatory (NSAID)  medications.  Risks of NSAIDS include, but are not limited to, upset stomach, ulcers in the stomach and other places in the gastrointestinal tract, and a mild increase in cardiovascular risk as a result of the antiinflammatory medications.  In addition, there is an increased risk in bleeding as a result of the medications.  The patient was advised to stop taking the NSAIDs if they cause them to have an upset stomach.  The patient was instructed to take the medication on a p.r.n. basis as needed only.  NSAIDs are not supposed to be taken every day for more than a few weeks.  If they have any questions or problems with the antiinflammatory medications, they should stop taking the medication immediately and call the office.    We will see the patient back in 6-8 weeks to reevaluate their shoulder pain. If they are still having pain at that time, we would then need to order a MRI to look for possible rotator cuff tears or biceps tearing in the shoulder.  The patient should call the office during business hours (9am-3pm; Monday - Friday)  with any questions or problems.  If the patient has any urgent issues outside of business hours, they should go to a local Emergency Room.

## 2024-01-24 ENCOUNTER — OFFICE VISIT (OUTPATIENT)
Dept: PRIMARY CARE | Facility: CLINIC | Age: 49
End: 2024-01-24
Payer: COMMERCIAL

## 2024-01-24 VITALS
BODY MASS INDEX: 35.51 KG/M2 | HEART RATE: 82 BPM | WEIGHT: 208 LBS | HEIGHT: 64 IN | SYSTOLIC BLOOD PRESSURE: 119 MMHG | DIASTOLIC BLOOD PRESSURE: 77 MMHG | TEMPERATURE: 98.1 F | OXYGEN SATURATION: 95 %

## 2024-01-24 DIAGNOSIS — F41.9 ANXIETY AND DEPRESSION: Primary | ICD-10-CM

## 2024-01-24 DIAGNOSIS — L24.89 IRRITANT CONTACT DERMATITIS DUE TO OTHER AGENTS: ICD-10-CM

## 2024-01-24 DIAGNOSIS — J30.9 ALLERGIC RHINITIS, UNSPECIFIED SEASONALITY, UNSPECIFIED TRIGGER: ICD-10-CM

## 2024-01-24 DIAGNOSIS — E78.2 COMBINED HYPERLIPIDEMIA: ICD-10-CM

## 2024-01-24 DIAGNOSIS — F32.A ANXIETY AND DEPRESSION: Primary | ICD-10-CM

## 2024-01-24 PROCEDURE — 3074F SYST BP LT 130 MM HG: CPT | Performed by: FAMILY MEDICINE

## 2024-01-24 PROCEDURE — 1036F TOBACCO NON-USER: CPT | Performed by: FAMILY MEDICINE

## 2024-01-24 PROCEDURE — 3078F DIAST BP <80 MM HG: CPT | Performed by: FAMILY MEDICINE

## 2024-01-24 PROCEDURE — 3008F BODY MASS INDEX DOCD: CPT | Performed by: FAMILY MEDICINE

## 2024-01-24 PROCEDURE — 99214 OFFICE O/P EST MOD 30 MIN: CPT | Performed by: FAMILY MEDICINE

## 2024-01-24 RX ORDER — FLUOCINONIDE 0.5 MG/G
OINTMENT TOPICAL 2 TIMES DAILY PRN
Qty: 30 G | Refills: 1 | Status: SHIPPED | OUTPATIENT
Start: 2024-01-24 | End: 2025-01-23

## 2024-01-24 NOTE — PATIENT INSTRUCTIONS
Irritant/contact dermatitis of unclear trigger  Will treat with steroid cream     get your blood work as ordered.  You should hear from our office with results whether they are normal are not within a few days.  Please call the office if you do not hear from us.     After you get testing done you will get notified from our office with regards to your results whether they are normal or not.  If you are registered in the electronic health record we will send you information in that system.  If you have any questions or need clarification please feel free to call the office.     Hypertension Plan:  You should check your blood pressures 2-3 times per month.  Your goal should be systolic (upper number ) < 140, and diastolic (bottom number) < 90.  Please periodically inform office of your BP numbers.  You should follow a low salt diet and exercise routinely.  It is important that you keep your weight under control.  With hypertension you should be seen in the office at least twice per year.     You should be getting cardiovascular exercise 3-5 times per week for 30-45 minutes.  This includes exercises such as running, brisk walking, biking or swimming.

## 2024-01-24 NOTE — PROGRESS NOTES
"Subjective   Patient ID: Farideh Gomez is a 49 y.o. female who presents for Rash which onset one week ago under her right axilla.       Rash  over the last week   Very itching  no where   No change in soaps or detergents     Is going to PT for arm right now       Hypertension  : Patient is taking blood pressure medications as directed.  Blood pressures have been averaging:  no checking   Pt denies Chest Pain or Shortness of Breath    Anxiety: Currently stable with anxiety, minimal symptoms.  Stable on current medications, helping with    symptoms           Review of Systems    Objective   /77   Pulse 82   Temp 36.7 °C (98.1 °F)   Ht 1.613 m (5' 3.5\")   Wt 94.3 kg (208 lb)   SpO2 95%   BMI 36.27 kg/m²     Physical Exam  Skin:     Comments: Erythematous papules along the right posterior upper arm and posterior axillary area along the lines of stretch marks, excoriations   Psychiatric:         Mood and Affect: Mood normal.         Assessment/Plan   Problem List Items Addressed This Visit             ICD-10-CM    Allergic rhinitis J30.9    Relevant Orders    Comprehensive Metabolic Panel    Lipid Panel    CBC    Anxiety and depression - Primary F41.9, F32.A    Relevant Orders    Comprehensive Metabolic Panel    Lipid Panel    CBC    Combined hyperlipidemia E78.2    Relevant Orders    Comprehensive Metabolic Panel    Lipid Panel    CBC     Other Visit Diagnoses         Codes    Irritant contact dermatitis due to other agents     L24.89    Relevant Medications    fluocinonide (Lidex) 0.05 % ointment               "

## 2024-02-25 DIAGNOSIS — I10 BENIGN HYPERTENSION: ICD-10-CM

## 2024-02-26 RX ORDER — LOSARTAN POTASSIUM 50 MG/1
50 TABLET ORAL DAILY
Qty: 90 TABLET | Refills: 0 | Status: SHIPPED | OUTPATIENT
Start: 2024-02-26 | End: 2024-05-23

## 2024-02-26 RX ORDER — DULAGLUTIDE 4.5 MG/.5ML
INJECTION, SOLUTION SUBCUTANEOUS
COMMUNITY
Start: 2024-01-26

## 2024-03-07 DIAGNOSIS — I10 BENIGN HYPERTENSION: ICD-10-CM

## 2024-03-07 DIAGNOSIS — J30.9 ALLERGIC RHINITIS, UNSPECIFIED: ICD-10-CM

## 2024-03-08 DIAGNOSIS — E78.2 COMBINED HYPERLIPIDEMIA: ICD-10-CM

## 2024-03-08 RX ORDER — MONTELUKAST SODIUM 10 MG/1
10 TABLET ORAL DAILY
Qty: 90 TABLET | Refills: 2 | Status: SHIPPED | OUTPATIENT
Start: 2024-03-08

## 2024-03-08 RX ORDER — LOSARTAN POTASSIUM 50 MG/1
50 TABLET ORAL DAILY
Qty: 90 TABLET | Refills: 0 | OUTPATIENT
Start: 2024-03-08

## 2024-03-08 RX ORDER — ATORVASTATIN CALCIUM 10 MG/1
10 TABLET, FILM COATED ORAL DAILY
Qty: 90 TABLET | Refills: 2 | Status: SHIPPED | OUTPATIENT
Start: 2024-03-08

## 2024-03-08 NOTE — TELEPHONE ENCOUNTER
Pt message via [employer]  Hello I only have 3 pills left and [facility] is saying you need to approve a refill.      - atorvastatin (Lipitor) 10 mg tablet

## 2024-04-22 DIAGNOSIS — F41.9 ANXIETY DISORDER, UNSPECIFIED: ICD-10-CM

## 2024-04-22 DIAGNOSIS — F32.A DEPRESSION, UNSPECIFIED: ICD-10-CM

## 2024-04-22 RX ORDER — DESVENLAFAXINE 100 MG/1
100 TABLET, EXTENDED RELEASE ORAL DAILY
Qty: 90 TABLET | Refills: 1 | Status: SHIPPED | OUTPATIENT
Start: 2024-04-22

## 2024-05-23 DIAGNOSIS — I10 BENIGN HYPERTENSION: ICD-10-CM

## 2024-05-23 RX ORDER — LOSARTAN POTASSIUM 50 MG/1
50 TABLET ORAL DAILY
Qty: 90 TABLET | Refills: 0 | Status: SHIPPED | OUTPATIENT
Start: 2024-05-23

## 2024-07-20 DIAGNOSIS — I10 BENIGN HYPERTENSION: ICD-10-CM

## 2024-07-22 RX ORDER — LOSARTAN POTASSIUM 50 MG/1
50 TABLET ORAL DAILY
Qty: 90 TABLET | Refills: 0 | Status: SHIPPED | OUTPATIENT
Start: 2024-07-22

## 2024-09-30 ENCOUNTER — OFFICE VISIT (OUTPATIENT)
Dept: PRIMARY CARE | Facility: CLINIC | Age: 49
End: 2024-09-30
Payer: COMMERCIAL

## 2024-09-30 VITALS
OXYGEN SATURATION: 98 % | WEIGHT: 187 LBS | SYSTOLIC BLOOD PRESSURE: 102 MMHG | BODY MASS INDEX: 32.61 KG/M2 | HEART RATE: 87 BPM | DIASTOLIC BLOOD PRESSURE: 70 MMHG | TEMPERATURE: 97.7 F

## 2024-09-30 DIAGNOSIS — E66.01 MORBID OBESITY WITH BODY MASS INDEX (BMI) OF 40.0 OR HIGHER (MULTI): ICD-10-CM

## 2024-09-30 DIAGNOSIS — E78.2 COMBINED HYPERLIPIDEMIA: Primary | ICD-10-CM

## 2024-09-30 DIAGNOSIS — I10 BENIGN HYPERTENSION: ICD-10-CM

## 2024-09-30 DIAGNOSIS — J01.00 ACUTE NON-RECURRENT MAXILLARY SINUSITIS: ICD-10-CM

## 2024-09-30 PROBLEM — K59.00 CONSTIPATION: Status: RESOLVED | Noted: 2023-05-09 | Resolved: 2024-09-30

## 2024-09-30 PROBLEM — M94.0 SLIPPING RIB SYNDROME: Status: RESOLVED | Noted: 2023-04-12 | Resolved: 2024-09-30

## 2024-09-30 PROBLEM — S62.624A CLOSED DISPLACED FRACTURE OF MIDDLE PHALANX OF RIGHT RING FINGER: Status: RESOLVED | Noted: 2023-05-09 | Resolved: 2024-09-30

## 2024-09-30 PROBLEM — R10.2 FEMALE PELVIC PAIN: Status: RESOLVED | Noted: 2023-04-12 | Resolved: 2024-09-30

## 2024-09-30 PROBLEM — S69.91XA INJURY OF FINGER OF RIGHT HAND: Status: RESOLVED | Noted: 2023-05-09 | Resolved: 2024-09-30

## 2024-09-30 PROBLEM — R29.898 HAND WEAKNESS: Status: RESOLVED | Noted: 2023-05-09 | Resolved: 2024-09-30

## 2024-09-30 PROBLEM — M54.50 LOW BACK PAIN: Status: RESOLVED | Noted: 2023-04-12 | Resolved: 2024-09-30

## 2024-09-30 PROCEDURE — 1036F TOBACCO NON-USER: CPT | Performed by: FAMILY MEDICINE

## 2024-09-30 PROCEDURE — 3074F SYST BP LT 130 MM HG: CPT | Performed by: FAMILY MEDICINE

## 2024-09-30 PROCEDURE — 99214 OFFICE O/P EST MOD 30 MIN: CPT | Performed by: FAMILY MEDICINE

## 2024-09-30 PROCEDURE — 3078F DIAST BP <80 MM HG: CPT | Performed by: FAMILY MEDICINE

## 2024-09-30 RX ORDER — AMOXICILLIN 875 MG/1
875 TABLET, FILM COATED ORAL 2 TIMES DAILY
Qty: 20 TABLET | Refills: 0 | Status: SHIPPED | OUTPATIENT
Start: 2024-09-30 | End: 2024-10-10

## 2024-09-30 ASSESSMENT — ENCOUNTER SYMPTOMS
LIGHT-HEADEDNESS: 1
ABDOMINAL DISTENTION: 0
FACIAL SWELLING: 0
VOMITING: 0
FREQUENCY: 0
CHILLS: 0
DIFFICULTY URINATING: 0
NAUSEA: 0
DIZZINESS: 1
COUGH: 0
NERVOUS/ANXIOUS: 0
SORE THROAT: 0
BACK PAIN: 0
CONFUSION: 0
HEADACHES: 1
SHORTNESS OF BREATH: 0
RHINORRHEA: 0
NUMBNESS: 0
BLOOD IN STOOL: 0
FEVER: 0
ARTHRALGIAS: 0
AGITATION: 0
EYE DISCHARGE: 0
PALPITATIONS: 0

## 2024-09-30 NOTE — PROGRESS NOTES
Subjective   Farideh Gomez is a 49 y.o. female who presents for follow-up and L ear pain and R ear fluid (currently sudafed).    HPI   L ear pain and R ear fluid. 5 days; 2 days of dizziness upon standing and has to take sudafed which resolves symptoms for ~4 hrs. FH notable for vertigo that two aunts have. BP has been normotensive. Has mild headache right now (might be sinus pain) and has noticed headache onset for last 4-5 days. Sick contacts include  having congestion. Noticed that L ear feels damp (clear) when waking up. Denies hearing loss, tinnitus. Notes that she stopped taking flonase 1-2 mo ago and restarted in last week.     Lost 10 pounds on tirzeptide in last 5 weeks. Diet includes fruit, vegetables, protein. Has been trying to do more tasks at home but no exercise routine.     Review of Systems   Constitutional:  Negative for chills and fever.   HENT:  Positive for ear discharge and ear pain. Negative for congestion, facial swelling, rhinorrhea, sneezing and sore throat.    Eyes:  Negative for discharge and visual disturbance.   Respiratory:  Negative for cough and shortness of breath.    Cardiovascular:  Negative for chest pain and palpitations.   Gastrointestinal:  Negative for abdominal distention, blood in stool, nausea and vomiting.   Endocrine: Negative for cold intolerance and heat intolerance.   Genitourinary:  Negative for difficulty urinating, dyspareunia and frequency.   Musculoskeletal:  Negative for arthralgias and back pain.        Mild posterior neck pain that may be related to headache.   Neurological:  Positive for dizziness, light-headedness and headaches. Negative for syncope and numbness.   Psychiatric/Behavioral:  Negative for agitation and confusion. The patient is not nervous/anxious.        Objective   /70   Pulse 87   Temp 36.5 °C (97.7 °F)   Wt 84.8 kg (187 lb)   SpO2 98%   BMI 32.61 kg/m²     Physical Exam  Constitutional:       Appearance: Normal  appearance.   HENT:      Head: Normocephalic and atraumatic.      Comments: Mild sinus tenderness     Right Ear: Ear canal and external ear normal.      Left Ear: Ear canal and external ear normal.      Ears:      Comments: Retracted tympanic membranes     Nose: Nose normal.      Mouth/Throat:      Mouth: Mucous membranes are moist.      Pharynx: Oropharynx is clear. No oropharyngeal exudate or posterior oropharyngeal erythema.   Skin:     General: Skin is warm and dry.   Neurological:      General: No focal deficit present.      Mental Status: She is alert and oriented to person, place, and time. Mental status is at baseline.           Assessment/Plan   Problem List Items Addressed This Visit             ICD-10-CM    Benign hypertension I10     Discontinued losartan 50 mg due to well-controlled blood pressure and recent weight loss.         Relevant Orders    Comprehensive Metabolic Panel    CBC and Auto Differential    Lipid Panel    Combined hyperlipidemia - Primary E78.2    Relevant Orders    Comprehensive Metabolic Panel    CBC and Auto Differential    Lipid Panel    Morbid obesity with body mass index (BMI) of 40.0 or higher (Multi) E66.01    Relevant Medications    tirzepatide 10 mg/0.5 mL pen injector    Other Relevant Orders    Comprehensive Metabolic Panel    CBC and Auto Differential    Lipid Panel     Other Visit Diagnoses         Codes    Acute non-recurrent maxillary sinusitis     J01.00    Relevant Medications    amoxicillin (Amoxil) 875 mg tablet

## 2024-09-30 NOTE — PATIENT INSTRUCTIONS
Get your blood work as ordered.  You should hear from our office with results whether they are normal are not within a few days.  Please call the office if you do not hear from us.       You may take over-the-counter medications as needed for symptom relief.  Call the office if your symptoms worsen such as high fever and worsening cough or increase in symptoms.    Follow up if symptoms worsen or persist.    Continue nasal spray     Stop the losartan     Please check your blood pressures and pulses over the next 2 weeks several times per week.  Please call the office and report results.        Decongestants as needed

## 2024-09-30 NOTE — PROGRESS NOTES
Subjective   Patient ID: Farideh Gomez is a 49 y.o. female who presents for Follow-up. States she has been on the Trizepatide for two months and prior to this she did the Semiglutide for 3-4 months w/o much weight loss; still hungry. Pt also doing intermittent fasting; pt also increased her protein and stopped eating junk food. Pt's c/o left ear pain and right ear fluid; taking OTC Sudafed. States she was dizzy upon standing until she took the decongestant.        Follow up on the weight meds   Eating well   Exercising       Hypertension  : Patient is taking blood pressure medications as directed.  Blood pressures have been averagin   Pt denies Chest Pain or Shortness of Breath        Review of Systems   Constitutional:  Negative for chills and fever.   HENT:  Positive for ear discharge and ear pain. Negative for congestion, facial swelling, rhinorrhea, sneezing and sore throat.    Eyes:  Negative for discharge and visual disturbance.   Respiratory:  Negative for cough and shortness of breath.    Cardiovascular:  Negative for chest pain and palpitations.   Gastrointestinal:  Negative for abdominal distention, blood in stool, nausea and vomiting.   Endocrine: Negative for cold intolerance and heat intolerance.   Genitourinary:  Negative for difficulty urinating, dyspareunia and frequency.   Musculoskeletal:  Negative for arthralgias and back pain.        Mild posterior neck pain that may be related to headache.   Neurological:  Positive for dizziness, light-headedness and headaches. Negative for syncope and numbness.   Psychiatric/Behavioral:  Negative for agitation and confusion. The patient is not nervous/anxious.    Review of Systems    Objective   /70   Pulse 87   Temp 36.5 °C (97.7 °F)   Wt 84.8 kg (187 lb)   SpO2 98%   BMI 32.61 kg/m²     Physical Exam  Constitutional:       Appearance: Normal appearance.   HENT:      Head: Normocephalic and atraumatic.      Right Ear: Tympanic membrane and  ear canal normal.      Left Ear: Tympanic membrane and ear canal normal.      Nose: No nasal deformity.      Right Sinus: Maxillary sinus tenderness present. No frontal sinus tenderness.      Left Sinus: Maxillary sinus tenderness present. No frontal sinus tenderness.      Mouth/Throat:      Mouth: Mucous membranes are moist. No oral lesions.      Tongue: No lesions.      Pharynx: Oropharynx is clear.   Eyes:      Pupils: Pupils are equal, round, and reactive to light.   Cardiovascular:      Rate and Rhythm: Normal rate and regular rhythm.   Pulmonary:      Effort: Pulmonary effort is normal.      Breath sounds: Normal breath sounds.   Musculoskeletal:      Cervical back: No tenderness.   Lymphadenopathy:      Cervical: No cervical adenopathy.   Neurological:      Mental Status: She is alert.   Psychiatric:         Mood and Affect: Mood normal.         Assessment/Plan   Problem List Items Addressed This Visit             ICD-10-CM    Benign hypertension I10     Discontinued losartan 50 mg due to well-controlled blood pressure and recent weight loss.         Relevant Orders    Comprehensive Metabolic Panel    CBC and Auto Differential    Lipid Panel    Combined hyperlipidemia - Primary E78.2    Relevant Orders    Comprehensive Metabolic Panel    CBC and Auto Differential    Lipid Panel    Morbid obesity with body mass index (BMI) of 40.0 or higher (Multi) E66.01    Relevant Medications    tirzepatide 10 mg/0.5 mL pen injector    Other Relevant Orders    Comprehensive Metabolic Panel    CBC and Auto Differential    Lipid Panel     Other Visit Diagnoses         Codes    Acute non-recurrent maxillary sinusitis     J01.00    Relevant Medications    amoxicillin (Amoxil) 875 mg tablet

## 2024-10-07 ENCOUNTER — LAB (OUTPATIENT)
Dept: LAB | Facility: LAB | Age: 49
End: 2024-10-07
Payer: COMMERCIAL

## 2024-10-07 DIAGNOSIS — E66.01 MORBID OBESITY WITH BODY MASS INDEX (BMI) OF 40.0 OR HIGHER (MULTI): ICD-10-CM

## 2024-10-07 DIAGNOSIS — I10 BENIGN HYPERTENSION: ICD-10-CM

## 2024-10-07 DIAGNOSIS — E78.2 COMBINED HYPERLIPIDEMIA: ICD-10-CM

## 2024-10-07 DIAGNOSIS — R53.83 OTHER FATIGUE: ICD-10-CM

## 2024-10-07 DIAGNOSIS — E88.810 METABOLIC SYNDROME: Primary | ICD-10-CM

## 2024-10-07 DIAGNOSIS — R63.5 ABNORMAL WEIGHT GAIN: ICD-10-CM

## 2024-10-07 DIAGNOSIS — E66.01 MORBID (SEVERE) OBESITY DUE TO EXCESS CALORIES (MULTI): ICD-10-CM

## 2024-10-07 LAB
25(OH)D3 SERPL-MCNC: 40 NG/ML (ref 30–100)
ALBUMIN SERPL BCP-MCNC: 4.4 G/DL (ref 3.4–5)
ALP SERPL-CCNC: 45 U/L (ref 33–110)
ALT SERPL W P-5'-P-CCNC: 17 U/L (ref 7–45)
ANION GAP SERPL CALC-SCNC: 13 MMOL/L (ref 10–20)
AST SERPL W P-5'-P-CCNC: 16 U/L (ref 9–39)
BASOPHILS # BLD AUTO: 0.03 X10*3/UL (ref 0–0.1)
BASOPHILS NFR BLD AUTO: 0.7 %
BILIRUB SERPL-MCNC: 0.5 MG/DL (ref 0–1.2)
BUN SERPL-MCNC: 16 MG/DL (ref 6–23)
CALCIUM SERPL-MCNC: 9.1 MG/DL (ref 8.6–10.3)
CHLORIDE SERPL-SCNC: 105 MMOL/L (ref 98–107)
CHOLEST SERPL-MCNC: 137 MG/DL (ref 0–199)
CHOLESTEROL/HDL RATIO: 2.5
CO2 SERPL-SCNC: 24 MMOL/L (ref 21–32)
CREAT SERPL-MCNC: 0.71 MG/DL (ref 0.5–1.05)
EGFRCR SERPLBLD CKD-EPI 2021: >90 ML/MIN/1.73M*2
EOSINOPHIL # BLD AUTO: 0.11 X10*3/UL (ref 0–0.7)
EOSINOPHIL NFR BLD AUTO: 2.4 %
ERYTHROCYTE [DISTWIDTH] IN BLOOD BY AUTOMATED COUNT: 13.2 % (ref 11.5–14.5)
EST. AVERAGE GLUCOSE BLD GHB EST-MCNC: 88 MG/DL
GLUCOSE SERPL-MCNC: 79 MG/DL (ref 74–99)
HBA1C MFR BLD: 4.7 %
HCT VFR BLD AUTO: 44.1 % (ref 36–46)
HDLC SERPL-MCNC: 55.3 MG/DL
HGB BLD-MCNC: 14.4 G/DL (ref 12–16)
IMM GRANULOCYTES # BLD AUTO: 0.01 X10*3/UL (ref 0–0.7)
IMM GRANULOCYTES NFR BLD AUTO: 0.2 % (ref 0–0.9)
INSULIN P FAST SERPL-ACNC: 23 UIU/ML (ref 3–25)
LDLC SERPL CALC-MCNC: 68 MG/DL
LIPASE SERPL-CCNC: 94 U/L (ref 9–82)
LYMPHOCYTES # BLD AUTO: 1.6 X10*3/UL (ref 1.2–4.8)
LYMPHOCYTES NFR BLD AUTO: 35.2 %
MCH RBC QN AUTO: 30.2 PG (ref 26–34)
MCHC RBC AUTO-ENTMCNC: 32.7 G/DL (ref 32–36)
MCV RBC AUTO: 93 FL (ref 80–100)
MONOCYTES # BLD AUTO: 0.3 X10*3/UL (ref 0.1–1)
MONOCYTES NFR BLD AUTO: 6.6 %
NEUTROPHILS # BLD AUTO: 2.49 X10*3/UL (ref 1.2–7.7)
NEUTROPHILS NFR BLD AUTO: 54.9 %
NON HDL CHOLESTEROL: 82 MG/DL (ref 0–149)
NRBC BLD-RTO: 0 /100 WBCS (ref 0–0)
PLATELET # BLD AUTO: 268 X10*3/UL (ref 150–450)
POTASSIUM SERPL-SCNC: 3.8 MMOL/L (ref 3.5–5.3)
PROT SERPL-MCNC: 7 G/DL (ref 6.4–8.2)
RBC # BLD AUTO: 4.77 X10*6/UL (ref 4–5.2)
SODIUM SERPL-SCNC: 138 MMOL/L (ref 136–145)
TRIGL SERPL-MCNC: 71 MG/DL (ref 0–149)
TSH SERPL-ACNC: 1.26 MIU/L (ref 0.44–3.98)
VIT B12 SERPL-MCNC: 1912 PG/ML (ref 211–911)
VLDL: 14 MG/DL (ref 0–40)
WBC # BLD AUTO: 4.5 X10*3/UL (ref 4.4–11.3)

## 2024-10-07 PROCEDURE — 83036 HEMOGLOBIN GLYCOSYLATED A1C: CPT

## 2024-10-07 PROCEDURE — 83690 ASSAY OF LIPASE: CPT

## 2024-10-07 PROCEDURE — 82306 VITAMIN D 25 HYDROXY: CPT

## 2024-10-07 PROCEDURE — 80053 COMPREHEN METABOLIC PANEL: CPT

## 2024-10-07 PROCEDURE — 85025 COMPLETE CBC W/AUTO DIFF WBC: CPT

## 2024-10-07 PROCEDURE — 84443 ASSAY THYROID STIM HORMONE: CPT

## 2024-10-07 PROCEDURE — 82607 VITAMIN B-12: CPT

## 2024-10-07 PROCEDURE — 83525 ASSAY OF INSULIN: CPT

## 2024-10-07 PROCEDURE — 80061 LIPID PANEL: CPT

## 2024-10-07 PROCEDURE — 36415 COLL VENOUS BLD VENIPUNCTURE: CPT

## 2024-10-08 NOTE — RESULT ENCOUNTER NOTE
Labs generally all look okay, cholesterol is much better than it was, blood count normal, vitamin B12 is a little high, if she is taking a supplements this may be why  Thyroid is okay

## 2024-10-10 ENCOUNTER — APPOINTMENT (OUTPATIENT)
Dept: PRIMARY CARE | Facility: CLINIC | Age: 49
End: 2024-10-10
Payer: COMMERCIAL

## 2024-10-17 DIAGNOSIS — F32.A DEPRESSION, UNSPECIFIED: ICD-10-CM

## 2024-10-17 DIAGNOSIS — F41.9 ANXIETY DISORDER, UNSPECIFIED: ICD-10-CM

## 2024-10-17 RX ORDER — DESVENLAFAXINE 100 MG/1
100 TABLET, EXTENDED RELEASE ORAL DAILY
Qty: 90 TABLET | Refills: 1 | Status: SHIPPED | OUTPATIENT
Start: 2024-10-17

## 2024-11-05 ENCOUNTER — TELEPHONE (OUTPATIENT)
Dept: PRIMARY CARE | Facility: CLINIC | Age: 49
End: 2024-11-05
Payer: COMMERCIAL

## 2024-11-05 NOTE — TELEPHONE ENCOUNTER
Pt states she is done with the refill on the steroid, and she still has the rash on her elbow area.  It is still very itchy, and has spread into a larger area.  Would Dr LÓPEZ want to refer to Dawn or have the pt re-evaluated in office.

## 2025-01-22 DIAGNOSIS — I10 BENIGN HYPERTENSION: ICD-10-CM

## 2025-01-22 RX ORDER — LOSARTAN POTASSIUM 50 MG/1
50 TABLET ORAL DAILY
Qty: 90 TABLET | Refills: 0 | Status: SHIPPED | OUTPATIENT
Start: 2025-01-22

## 2025-01-28 ENCOUNTER — OFFICE VISIT (OUTPATIENT)
Dept: ORTHOPEDIC SURGERY | Facility: CLINIC | Age: 50
End: 2025-01-28
Payer: COMMERCIAL

## 2025-01-28 DIAGNOSIS — M19.041: ICD-10-CM

## 2025-01-28 DIAGNOSIS — M75.21 BICEPS TENDINITIS OF RIGHT SHOULDER: ICD-10-CM

## 2025-01-28 DIAGNOSIS — M65.332 TRIGGER FINGER, LEFT MIDDLE FINGER: Primary | ICD-10-CM

## 2025-01-28 PROCEDURE — 2500000004 HC RX 250 GENERAL PHARMACY W/ HCPCS (ALT 636 FOR OP/ED): Performed by: ORTHOPAEDIC SURGERY

## 2025-01-28 PROCEDURE — 99203 OFFICE O/P NEW LOW 30 MIN: CPT | Performed by: ORTHOPAEDIC SURGERY

## 2025-01-28 PROCEDURE — 76942 ECHO GUIDE FOR BIOPSY: CPT | Performed by: ORTHOPAEDIC SURGERY

## 2025-01-28 PROCEDURE — 20550 NJX 1 TENDON SHEATH/LIGAMENT: CPT | Mod: RT | Performed by: ORTHOPAEDIC SURGERY

## 2025-01-28 PROCEDURE — 99213 OFFICE O/P EST LOW 20 MIN: CPT | Mod: 25 | Performed by: ORTHOPAEDIC SURGERY

## 2025-01-28 PROCEDURE — 1036F TOBACCO NON-USER: CPT | Performed by: ORTHOPAEDIC SURGERY

## 2025-01-28 RX ADMIN — METHYLPREDNISOLONE ACETATE 30 MG: 40 INJECTION, SUSPENSION INTRA-ARTICULAR; INTRALESIONAL; INTRAMUSCULAR; SOFT TISSUE at 11:16

## 2025-01-28 RX ADMIN — LIDOCAINE HYDROCHLORIDE 2 ML: 10 INJECTION, SOLUTION INFILTRATION; PERINEURAL at 11:16

## 2025-01-28 ASSESSMENT — ENCOUNTER SYMPTOMS
FEVER: 0
CHILLS: 0
BRUISES/BLEEDS EASILY: 0
WHEEZING: 0
FATIGUE: 0
SHORTNESS OF BREATH: 0
ARTHRALGIAS: 1

## 2025-01-28 ASSESSMENT — PAIN - FUNCTIONAL ASSESSMENT: PAIN_FUNCTIONAL_ASSESSMENT: 0-10

## 2025-01-28 ASSESSMENT — PAIN SCALES - GENERAL: PAINLEVEL_OUTOF10: 3

## 2025-01-28 NOTE — PROGRESS NOTES
Reason for Appointment  Chief Complaint   Patient presents with    Left Hand - Pain     History of Present Illness  New patient is a 50 y.o. female here today for evaluation of left hand pain. PMHx includes HTN, HLD. X-rays taken of the right ring on 6/12/2023 were reviewed and showed three small screws in the base of the middle phalanx (p2) of the right ring. X-rays taken of the right on 10/18/23 were reviewed and showed no sig djd. Today she reports 2 years she broke her right ring finger from an injury and had open treatment with Dr. Neri. She says has pins in her finger. She has stiffness in the joint. She reports it has never gotten better. She has a mild angular deformity. She does not like the way it looks. She still get pain in the finger. She also reports a left long trigger finger that is painful, this started a month ago. She has no sig triggering. 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 Past medical history allergies medications social and family history all reviewed.       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    Foreign body in vulva and vagina, initial encounter 02/22/2017    Retained tampon    Morbid (severe) obesity due to excess calories (Multi) 10/08/2021    Morbid obesity with body mass index (BMI) of 40.0 or higher    Other abnormal glucose     Hemoglobin A1c less than 7.0%    Otitis externa 04/12/2023    Pelvic and perineal pain 01/15/2018    Female pelvic pain    Personal history of (healed) traumatic fracture 04/11/2022    History of fracture of rib    Personal history of other diseases of the musculoskeletal system and  connective tissue 01/15/2018    History of low back pain    Personal history of other diseases of the respiratory system     History of pharyngitis    Rib fracture 2023    Unspecified condition associated with female genital organs and menstrual cycle 2017    Vaginal discomfort       Past Surgical History:   Procedure Laterality Date    ANKLE SURGERY  2017    Ankle Surgery    APPENDECTOMY  2017    Appendectomy       Medication Documentation Review Audit       Reviewed by Ester Landaverde MA (Medical Assistant) on 25 at 1108      Medication Order Taking? Sig Documenting Provider Last Dose Status   atorvastatin (Lipitor) 10 mg tablet [id number] Yes Take 1 tablet (10 mg) by mouth once daily. Cata Luis MD Taking Active   desvenlafaxine 100 mg 24 hr tablet [id number] Yes TAKE 1 TABLET BY MOUTH EVERY DAY Cata Luis MD  Active   fluocinonide (Lidex) 0.05 % ointment [id number] No Apply topically 2 times a day as needed for irritation or rash. Avoid face and groin. Cata Luis MD Taking  25 3169   fluticasone (Flonase) 50 mcg/actuation nasal spray [id number] Yes ADMINISTER 2 SPRAYS INTO EACH NOSTRIL ONCE DAILY. SHAKE GENTLY. BEFORE FIRST USE, PRIME PUMP. AFTER USE, CLEAN TIP AND REPLACE CAP. Cata Luis MD Taking Active   hydrOXYzine HCL (Atarax) 25 mg tablet [id number] Yes TAKE 1 TABLET 3 TIMES DAILY AS NEEDED. FOR ANXIETY Cata Luis MD Taking Active   ipratropium (Atrovent) 21 mcg (0.03 %) nasal spray [id number] Yes TAKE 2 SPRAY(S) INTRANASALLY 3 TIMES A DAY, AS NEEDED FOR NASAL CONGESTION Historical Provider, MD Not Taking Active   losartan (Cozaar) 50 mg tablet [id number] Yes TAKE 1 TABLET BY MOUTH EVERY DAY Cata Luis MD  Active   montelukast (Singulair) 10 mg tablet [id number] Yes TAKE 1 TABLET BY MOUTH EVERY DAY Cata Luis MD Taking Active   tirzepatide 10 mg/0.5 mL pen injector [id number] Yes Inject 10 mg under the skin every 7  days. Cata Luis MD  Active                    No Known Allergies    Review of Systems   Constitutional:  Negative for chills, fatigue and fever.   Respiratory:  Negative for shortness of breath and wheezing.    Cardiovascular:  Negative for chest pain and leg swelling.   Musculoskeletal:  Positive for arthralgias.   Allergic/Immunologic: Negative for immunocompromised state.   Hematological:  Does not bruise/bleed easily.       Exam   Pt is alert awake, orientated to person place and time. No acute distress. Mood is good. Good pulses and good sensation. No skin changes. Good capillary refill. Good cervical rom. Full shoulder rom. Very mild weakness in external rotation. Mild positive impingement sign. Tenderness right biceps. Good cuff strength. Mild angular deformity right ring. Lacks 30 degrees composite flexion of the right ring. Soreness over the right knuckle. On exam of the left long finger there is tenderness over the A1 pulley with no sig palpable triggering,    Assessment   Early left long trigger finger  Biceps tendinitis, right  Osteoarthritis right ring  Plan     We discussed for her trigger finger splinting and an arthritis glove with the use of Voltaren gel. We discussed getting x-rays of the right ring finger can be helpful to  the arthritic change. There is no crepitation and any surgery even hardware removal we have to be careful due to the sig risk of worsening stiffness. The patient is having symptoms that are indicative of biceps tendinitis. We will do one cortisone injection into the right biceps tendon sheath in hopes to calm their symptoms nicely. Pt understands the small risk of infection and warning signs including flare reaction.    Patient ID: Farideh Gomez is a 50 y.o. female.    Tendon Sheath Injection: right long head of biceps tendon sheath on 1/28/2025 11:16 AM  Indications: pain  Details: ultrasound-guided  Medications: 2 mL lidocaine 10 mg/mL (1 %); 30 mg  methylPREDNISolone acetate 40 mg/mL  Outcome: tolerated well, no immediate complications    After discussing the risks and benefits of the procedure with proceeded with an injection. Using ultrasound guidance we identified the greater and lesser tuberosities and the biceps tendons sheath, images obtained and saved. We then sterilely injected the right biceps tendon sheath with a mixture of 30 mg of Depo-Medrol and 2 cc of 1 % lidocaine. Pt tolerated the procedure well without any adverse reactions    Procedure, treatment alternatives, risks and benefits explained, specific risks discussed. Consent was given by the patient. Immediately prior to procedure a time out was called to verify the correct patient, procedure, equipment, support staff and site/side marked as required. Patient was prepped and draped in the usual sterile fashion.           I, Jenny Long, attest that this documentation has been prepared under the direction and in the presence of Alonso Noriega MD.   By signing below, IAlonso MD, personally performed the services described in this documentation. All medical record entries made by the scribe were at my direction and in my presence. I have reviewed the chart and agree that the record reflects my personal performance and is accurate and complete.

## 2025-01-29 RX ORDER — METHYLPREDNISOLONE ACETATE 40 MG/ML
30 INJECTION, SUSPENSION INTRA-ARTICULAR; INTRALESIONAL; INTRAMUSCULAR; SOFT TISSUE
Status: COMPLETED | OUTPATIENT
Start: 2025-01-28 | End: 2025-01-28

## 2025-01-29 RX ORDER — LIDOCAINE HYDROCHLORIDE 10 MG/ML
2 INJECTION, SOLUTION INFILTRATION; PERINEURAL
Status: COMPLETED | OUTPATIENT
Start: 2025-01-28 | End: 2025-01-28
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
## 2025-02-03 PROBLEM — S70.219A: Status: RESOLVED | Noted: 2025-02-03 | Resolved: 2025-02-03

## 2025-02-03 PROBLEM — T19.2XXA RETAINED TAMPON: Status: RESOLVED | Noted: 2025-02-03 | Resolved: 2025-02-03

## 2025-02-03 RX ORDER — CLOBETASOL PROPIONATE 0.5 MG/G
OINTMENT TOPICAL
COMMUNITY
Start: 2024-12-18 | End: 2025-02-05 | Stop reason: ALTCHOICE

## 2025-02-03 RX ORDER — METRONIDAZOLE 7.5 MG/G
CREAM TOPICAL
COMMUNITY
Start: 2024-07-23 | End: 2025-02-05 | Stop reason: ALTCHOICE

## 2025-02-03 RX ORDER — TRIAMCINOLONE ACETONIDE 5 MG/G
CREAM TOPICAL
COMMUNITY
Start: 2024-10-22 | End: 2025-02-05 | Stop reason: ALTCHOICE

## 2025-02-03 RX ORDER — KETOCONAZOLE 20 MG/G
CREAM TOPICAL
COMMUNITY
Start: 2024-11-28 | End: 2025-02-05 | Stop reason: ALTCHOICE

## 2025-02-03 RX ORDER — KETOCONAZOLE 20 MG/ML
SHAMPOO, SUSPENSION TOPICAL
COMMUNITY
Start: 2024-12-18 | End: 2025-02-05 | Stop reason: ALTCHOICE

## 2025-02-05 ENCOUNTER — APPOINTMENT (OUTPATIENT)
Dept: PRIMARY CARE | Facility: CLINIC | Age: 50
End: 2025-02-05
Payer: COMMERCIAL

## 2025-02-05 VITALS
HEART RATE: 95 BPM | WEIGHT: 160 LBS | OXYGEN SATURATION: 100 % | DIASTOLIC BLOOD PRESSURE: 64 MMHG | BODY MASS INDEX: 27.31 KG/M2 | SYSTOLIC BLOOD PRESSURE: 101 MMHG | HEIGHT: 64 IN

## 2025-02-05 DIAGNOSIS — M25.511 ACUTE PAIN OF RIGHT SHOULDER: Primary | ICD-10-CM

## 2025-02-05 DIAGNOSIS — E78.2 COMBINED HYPERLIPIDEMIA: ICD-10-CM

## 2025-02-05 PROBLEM — I10 BENIGN HYPERTENSION: Status: RESOLVED | Noted: 2023-04-12 | Resolved: 2025-02-05

## 2025-02-05 PROBLEM — M79.644 PAIN OF FINGER OF RIGHT HAND: Status: RESOLVED | Noted: 2023-05-09 | Resolved: 2025-02-05

## 2025-02-05 PROBLEM — E66.01 MORBID OBESITY WITH BODY MASS INDEX (BMI) OF 40.0 OR HIGHER (MULTI): Status: RESOLVED | Noted: 2023-04-12 | Resolved: 2025-02-05

## 2025-02-05 PROCEDURE — 99214 OFFICE O/P EST MOD 30 MIN: CPT | Performed by: FAMILY MEDICINE

## 2025-02-05 PROCEDURE — 3008F BODY MASS INDEX DOCD: CPT | Performed by: FAMILY MEDICINE

## 2025-02-05 PROCEDURE — 1036F TOBACCO NON-USER: CPT | Performed by: FAMILY MEDICINE

## 2025-02-05 RX ORDER — PREDNISONE 10 MG/1
10 TABLET ORAL 2 TIMES DAILY
Qty: 10 TABLET | Refills: 0 | Status: SHIPPED | OUTPATIENT
Start: 2025-02-05 | End: 2025-02-10

## 2025-02-05 ASSESSMENT — ENCOUNTER SYMPTOMS
TINGLING: 0
PERIANAL NUMBNESS: 0
PARESIS: 0
LEG PAIN: 0
DYSURIA: 0
SHORTNESS OF BREATH: 1
BACK PAIN: 1
COUGH: 1
WEAKNESS: 0
NUMBNESS: 0
ABDOMINAL PAIN: 0
HEADACHES: 0
WEIGHT LOSS: 0
FEVER: 0
BOWEL INCONTINENCE: 0
PARESTHESIAS: 0

## 2025-02-05 ASSESSMENT — PATIENT HEALTH QUESTIONNAIRE - PHQ9
1. LITTLE INTEREST OR PLEASURE IN DOING THINGS: NOT AT ALL
2. FEELING DOWN, DEPRESSED OR HOPELESS: NOT AT ALL
SUM OF ALL RESPONSES TO PHQ9 QUESTIONS 1 AND 2: 0

## 2025-02-05 NOTE — PROGRESS NOTES
"Subjective   Patient ID: Farideh Gomez is a 50 y.o. female who presents for Sick Visit (Farideh is here today for same day sick visit with c/o right sided trapezius area pain. Pt reports that she has had her pain since  2/1/25 (x 5 days) ).    Pain upper right back   Over the last week   Was sitting at the time   No recent trauma     Cortisone shot in biceps last week     Hard to take deep breaths   Lidocaine patches   Naproxen some relief     On tirzepatide since Sept lost 50#     Back Pain  This is a new problem. The current episode started in the past 7 days. The problem occurs constantly. The problem is unchanged. The quality of the pain is described as shooting. The pain does not radiate. The pain is at a severity of 5/10. The pain is The same all the time. Pertinent negatives include no abdominal pain, bladder incontinence, bowel incontinence, chest pain, dysuria, fever, headaches, leg pain, numbness, paresis, paresthesias, pelvic pain, perianal numbness, tingling, weakness or weight loss. Risk factors include poor posture.        Review of Systems   Constitutional:  Negative for fever and weight loss.   Respiratory:  Positive for cough and shortness of breath.    Cardiovascular:  Negative for chest pain.   Gastrointestinal:  Negative for abdominal pain and bowel incontinence.   Genitourinary:  Negative for bladder incontinence, dysuria and pelvic pain.   Musculoskeletal:  Positive for back pain.   Neurological:  Negative for tingling, weakness, numbness, headaches and paresthesias.       Objective   /64 (BP Location: Right arm, Patient Position: Sitting)   Pulse 95   Ht 1.613 m (5' 3.5\")   Wt 72.6 kg (160 lb)   SpO2 100%   BMI 27.90 kg/m²     Physical Exam  Constitutional:       Appearance: Normal appearance.   HENT:      Head: Normocephalic and atraumatic.      Right Ear: Tympanic membrane and ear canal normal.      Left Ear: Tympanic membrane and ear canal normal.      Nose: No nasal deformity. "      Right Sinus: No maxillary sinus tenderness or frontal sinus tenderness.      Left Sinus: No maxillary sinus tenderness or frontal sinus tenderness.      Mouth/Throat:      Mouth: Mucous membranes are moist. No oral lesions.      Tongue: No lesions.      Pharynx: Oropharynx is clear.   Cardiovascular:      Rate and Rhythm: Normal rate and regular rhythm.   Pulmonary:      Effort: Pulmonary effort is normal.      Breath sounds: Normal breath sounds.   Musculoskeletal:      Cervical back: No tenderness.      Comments: No thoracic spine tenderness  Area of tenderness is in the right mid scapula  Range of motion of the shoulder is intact  Right shoulder nontender   Lymphadenopathy:      Cervical: No cervical adenopathy.   Neurological:      Mental Status: She is alert.   Psychiatric:         Mood and Affect: Mood normal.         Behavior: Behavior normal.         Assessment/Plan   Problem List Items Addressed This Visit             ICD-10-CM    Combined hyperlipidemia E78.2     Other Visit Diagnoses         Codes    Acute pain of right shoulder    -  Primary M25.511    Relevant Medications    predniSONE (Deltasone) 10 mg tablet

## 2025-02-05 NOTE — PATIENT INSTRUCTIONS
Musculoskeletal pain, pinched nerve    Patches and tylenol     Lidocaine   Salonpas patches     Add steroid     Follow up if symptoms worsen or persist.    Hypertension resolved with weight loss    Hyperlipidemia improved with weight loss

## 2025-02-14 DIAGNOSIS — F41.9 ANXIETY: Primary | ICD-10-CM

## 2025-02-14 RX ORDER — BUSPIRONE HYDROCHLORIDE 7.5 MG/1
7.5 TABLET ORAL 2 TIMES DAILY PRN
Qty: 60 TABLET | Refills: 2 | Status: SHIPPED | OUTPATIENT
Start: 2025-02-14 | End: 2026-02-14

## 2025-03-11 DIAGNOSIS — F41.9 ANXIETY: ICD-10-CM

## 2025-03-11 RX ORDER — BUSPIRONE HYDROCHLORIDE 7.5 MG/1
7.5 TABLET ORAL 2 TIMES DAILY PRN
Qty: 180 TABLET | Refills: 3 | Status: SHIPPED | OUTPATIENT
Start: 2025-03-11 | End: 2026-03-11

## 2025-03-12 ENCOUNTER — APPOINTMENT (OUTPATIENT)
Dept: OBSTETRICS AND GYNECOLOGY | Facility: CLINIC | Age: 50
End: 2025-03-12
Payer: COMMERCIAL

## 2025-03-12 VITALS
SYSTOLIC BLOOD PRESSURE: 124 MMHG | HEIGHT: 65 IN | DIASTOLIC BLOOD PRESSURE: 80 MMHG | WEIGHT: 165 LBS | BODY MASS INDEX: 27.49 KG/M2

## 2025-03-12 DIAGNOSIS — N92.1 MENORRHAGIA WITH IRREGULAR CYCLE: ICD-10-CM

## 2025-03-12 DIAGNOSIS — Z12.31 ENCOUNTER FOR SCREENING MAMMOGRAM FOR MALIGNANT NEOPLASM OF BREAST: ICD-10-CM

## 2025-03-12 DIAGNOSIS — Z01.419 WELL WOMAN EXAM WITH ROUTINE GYNECOLOGICAL EXAM: Primary | ICD-10-CM

## 2025-03-12 DIAGNOSIS — Z12.4 SCREENING FOR MALIGNANT NEOPLASM OF CERVIX: ICD-10-CM

## 2025-03-12 PROCEDURE — 87624 HPV HI-RISK TYP POOLED RSLT: CPT

## 2025-03-12 PROCEDURE — 99386 PREV VISIT NEW AGE 40-64: CPT | Performed by: OBSTETRICS & GYNECOLOGY

## 2025-03-12 PROCEDURE — 3008F BODY MASS INDEX DOCD: CPT | Performed by: OBSTETRICS & GYNECOLOGY

## 2025-03-12 PROCEDURE — 1036F TOBACCO NON-USER: CPT | Performed by: OBSTETRICS & GYNECOLOGY

## 2025-03-12 NOTE — PROGRESS NOTES
"Subjective   Farideh Gomez is a 50 y.o. female who is here for a routine well woman exam.   New patient.    PAP 8/30/05 NEG HPV NEG   MAMM 2023   DEXA 2022   COLON COLOGAURD 10/21/22 NEG     C/o menstrual cycles getting closer together, every 14 days. Started a few months ago.  Bleeding lasts 5 days.  Last period very heavy. Pad and tampon.  Not much cramping.    More PMS. Feeling edgy, anxious for couple weeks before periods.  Some night sweats.    Urine stress incontinence, or if full  Vaginal discharge sometimes. No itching or discharge.    Last pap 2005?    Mammogram 7/10/23 NEG, not dense.  No breast concerns.    FH: Maternal aunt ovarian cancer. Paternal aunt cervical    ROS:  Constitutional: Negative  Eyes: Negative  HEENT: Negative  Respiratory: Negative  Cardiovascular: Negative  GI: Negative  : + discharge, incontinence, heavy period  Endocrine: Negative  Breast: Negative  Musculoskeletal: Negative  Skin: Negative  Hematologic: Negative  Neurologic: Negative  Psychiatric: + anxiety    Objective   /80   Ht 1.651 m (5' 5\")   Wt 74.8 kg (165 lb)   LMP 02/26/2025 (Exact Date)   BMI 27.46 kg/m²     Constitutional: Alert and in no acute distress.     Pulmonary: No respiratory distress.     Chest: Breasts: Normal appearance, no nipple discharge, and no skin changes. Palpation of breasts and axillae: No palpable mass and no axillary lymphadenopathy.    Abdomen: Soft, nontender; no abdominal mass palpated.     Genitourinary:   External genitalia: Normal appearance.  No inguinal lymphadenopathy.   Bartholin's, Urethral, and Skenes Glands: Normal.   Urethra: Normal. Bladder: Normal on palpation.   Vagina: mild vaginal wall prolapse.  Cervix: Normal appearance, nontender.   Uterus/Adnexa: Normal size, mobile uterus. Nontender, no masses palpated in adnexa  Inspection of perianal area: Normal.    Musculoskeletal: No joint swelling seen, normal movements of all extremities.    Skin: Normal skin color and " pigmentation, normal skin turgor, and no rash.    Psychiatric: Alert and oriented x 3. Affect normal to patient's baseline. Mood: Appropriate.       Assessment/Plan   1. Well woman exam with routine gynecological exam (Primary)  2. Encounter for screening mammogram for malignant neoplasm of breast  - BI mammo bilateral screening tomosynthesis; Future  3. Screening for malignant neoplasm of cervix  - THINPREP PAP TEST (>30)  4. Menorrhagia with irregular cycle  - US PELVIS TRANSABDOMINAL WITH TRANSVAGINAL; Future    Breast and pelvic exam done today.  Pap and HPV screening ordered.   She was given an order for a mammogram for breast cancer screening.  Recommend ultrasound to evaluate uterus for abnormal changes that may be causing more frequent and heavier bleeding. Likely given her age and other symptoms, she is starting perimenopause.  Discussed pelvic floor exercises, dietary changes, bladder training to help with incontinence.  Advised her to call with any new problems or questions.  Follow up in 1 year for the next well woman visit.

## 2025-03-12 NOTE — PATIENT INSTRUCTIONS
Routine Gynecologic Visit:  You were seen today for a routine gyn visit   A pap smear test was done today. You should still continue to get annual breast and pelvic exams in the office.  An order was placed in the system for mammogram. Please get done at your earliest convenience    Gynecologic Visit for Abnormal Uterine Bleeding:  Abnormal uterine bleeding can include menstrual bleeding that is excessively heavy, painful, prolonged, bleeding between periods, bleeding after/during intercourse, or a combination of these  Causes can include hormonal changes affecting ovulation, unstable and/or thickened endometrial lining, structural abnormalities of the uterus like polyps or fibroids, adenomyosis or endometriosis, and cervical/vaginal abnormalities  You have been given orders for ultrasound imaging. The results will help guide necessity of further workup, such as endometrial sampling, and management options. You will receive results by phone/MyChart  Treatment options may include expectant/symptomatic treatment, hormonal suppression and support of the endometrium, surgical thinning of the endometrium with D&C and/or removal of endometrial abnormalities, and hysterectomy  If you have any questions or concerns prior to discussion of lab and imaging results, please call the office. [phone] ([place]) or [phone] ([place])     You were given a printout of information on pelvic floor exercises.

## 2025-03-15 DIAGNOSIS — E78.2 COMBINED HYPERLIPIDEMIA: ICD-10-CM

## 2025-03-17 ENCOUNTER — APPOINTMENT (OUTPATIENT)
Dept: RADIOLOGY | Facility: HOSPITAL | Age: 50
End: 2025-03-17
Payer: COMMERCIAL

## 2025-03-17 RX ORDER — ATORVASTATIN CALCIUM 10 MG/1
10 TABLET, FILM COATED ORAL DAILY
Qty: 90 TABLET | Refills: 0 | Status: SHIPPED | OUTPATIENT
Start: 2025-03-17

## 2025-03-19 ENCOUNTER — APPOINTMENT (OUTPATIENT)
Dept: RADIOLOGY | Facility: HOSPITAL | Age: 50
End: 2025-03-19
Payer: COMMERCIAL

## 2025-03-24 ENCOUNTER — APPOINTMENT (OUTPATIENT)
Dept: RADIOLOGY | Facility: HOSPITAL | Age: 50
End: 2025-03-24
Payer: COMMERCIAL

## 2025-03-28 ENCOUNTER — HOSPITAL ENCOUNTER (OUTPATIENT)
Dept: RADIOLOGY | Facility: HOSPITAL | Age: 50
Discharge: HOME | End: 2025-03-28
Payer: COMMERCIAL

## 2025-03-28 VITALS — HEIGHT: 65 IN | WEIGHT: 162 LBS | BODY MASS INDEX: 26.99 KG/M2

## 2025-03-28 DIAGNOSIS — Z12.31 ENCOUNTER FOR SCREENING MAMMOGRAM FOR MALIGNANT NEOPLASM OF BREAST: ICD-10-CM

## 2025-03-28 PROCEDURE — 77067 SCR MAMMO BI INCL CAD: CPT | Performed by: RADIOLOGY

## 2025-03-28 PROCEDURE — 77063 BREAST TOMOSYNTHESIS BI: CPT | Performed by: RADIOLOGY

## 2025-03-28 PROCEDURE — 77067 SCR MAMMO BI INCL CAD: CPT

## 2025-04-08 DIAGNOSIS — J30.9 ALLERGIC RHINITIS, UNSPECIFIED: ICD-10-CM

## 2025-04-09 RX ORDER — MONTELUKAST SODIUM 10 MG/1
10 TABLET ORAL DAILY
Qty: 90 TABLET | Refills: 3 | Status: SHIPPED | OUTPATIENT
Start: 2025-04-09

## 2025-04-19 DIAGNOSIS — F32.A DEPRESSION, UNSPECIFIED: ICD-10-CM

## 2025-04-19 DIAGNOSIS — F41.9 ANXIETY DISORDER, UNSPECIFIED: ICD-10-CM

## 2025-04-21 RX ORDER — DESVENLAFAXINE 100 MG/1
100 TABLET, EXTENDED RELEASE ORAL DAILY
Qty: 90 TABLET | Refills: 1 | Status: SHIPPED | OUTPATIENT
Start: 2025-04-21

## 2025-06-18 DIAGNOSIS — E78.2 COMBINED HYPERLIPIDEMIA: ICD-10-CM

## 2025-06-18 RX ORDER — ATORVASTATIN CALCIUM 10 MG/1
10 TABLET, FILM COATED ORAL DAILY
Qty: 90 TABLET | Refills: 0 | Status: SHIPPED | OUTPATIENT
Start: 2025-06-18

## 2025-07-15 ENCOUNTER — APPOINTMENT (OUTPATIENT)
Dept: ORTHOPEDIC SURGERY | Facility: CLINIC | Age: 50
End: 2025-07-15
Payer: COMMERCIAL